# Patient Record
Sex: MALE | Race: WHITE | NOT HISPANIC OR LATINO | Employment: FULL TIME | URBAN - METROPOLITAN AREA
[De-identification: names, ages, dates, MRNs, and addresses within clinical notes are randomized per-mention and may not be internally consistent; named-entity substitution may affect disease eponyms.]

---

## 2017-02-27 ENCOUNTER — TELEPHONE (OUTPATIENT)
Dept: FAMILY MEDICINE CLINIC | Facility: CLINIC | Age: 52
End: 2017-02-27

## 2017-03-06 ENCOUNTER — OFFICE VISIT (OUTPATIENT)
Dept: FAMILY MEDICINE CLINIC | Facility: CLINIC | Age: 52
End: 2017-03-06

## 2017-03-06 VITALS
BODY MASS INDEX: 28.32 KG/M2 | OXYGEN SATURATION: 95 % | HEIGHT: 74 IN | HEART RATE: 76 BPM | WEIGHT: 220.7 LBS | TEMPERATURE: 97 F | SYSTOLIC BLOOD PRESSURE: 141 MMHG | DIASTOLIC BLOOD PRESSURE: 94 MMHG

## 2017-03-06 DIAGNOSIS — M54.5 CHRONIC LOW BACK PAIN, UNSPECIFIED BACK PAIN LATERALITY, WITH SCIATICA PRESENCE UNSPECIFIED: Primary | ICD-10-CM

## 2017-03-06 DIAGNOSIS — G89.29 CHRONIC LOW BACK PAIN, UNSPECIFIED BACK PAIN LATERALITY, WITH SCIATICA PRESENCE UNSPECIFIED: Primary | ICD-10-CM

## 2017-03-06 PROCEDURE — 99213 OFFICE O/P EST LOW 20 MIN: CPT | Performed by: FAMILY MEDICINE

## 2017-03-06 RX ORDER — TRAMADOL HYDROCHLORIDE 50 MG/1
50 TABLET ORAL DAILY PRN
Qty: 90 TABLET | Refills: 1 | Status: SHIPPED | OUTPATIENT
Start: 2017-03-06 | End: 2017-08-28 | Stop reason: SDUPTHER

## 2017-03-06 NOTE — PROGRESS NOTES
"Subjective   Delta Cormier is a 51 y.o. male.     Chief Complaint   Patient presents with   • Med Refill     med refill tramadol        History of Present Illness    Low back pain. Long history. Radicular to left buttocks.  Had an injury about 25 years ago.  Takes occasional Aleve.  Occasional Tylenol also.  Mostly takes tramadol once a day.  He was skiing a few weeks ago took 2 a day.  He ran out about a week ago.  He's had some moodiness since coming off of the tramadol which he is taking daily as above.  However he does not feel like he is dependent.  On average taking one a day.  No longer taking Celebrex, stopped couple years ago.      The following portions of the patient's history were reviewed and updated as appropriate: allergies, current medications, past family history, past medical history, past social history, past surgical history and problem list.          Review of Systems   Constitutional: Negative.    Musculoskeletal: Positive for back pain. Negative for gait problem.       Objective   Blood pressure 141/94, pulse 76, temperature 97 °F (36.1 °C), height 74\" (188 cm), weight 220 lb 11.2 oz (100 kg), SpO2 95 %.  Physical Exam   Constitutional: No distress.   Musculoskeletal:   Low back.  No midline pain.  Good range of motion.  Negative straight leg lift bilaterally.  Hamstrings are tight.  Gait is unremarkable.   Skin: Skin is warm and dry. He is not diaphoretic.   Psychiatric: He has a normal mood and affect. His behavior is normal.       Assessment/Plan   Delta was seen today for med refill.    Diagnoses and all orders for this visit:    Chronic low back pain, unspecified back pain laterality, with sciatica presence unspecified  -     traMADol (ULTRAM) 50 MG tablet; Take 1 tablet by mouth Daily As Needed for moderate pain (4-6) or severe pain (7-10).      Elevated blood pressure reading today.  Repeat is 130/90.  Recommend he continue to monitor blood pressure.  Check a few times a week.  Send us " readings in the next couple weeks.    Chronic back pain.  Taking tramadol daily.  Continue same.The patient has read and signed the [facility] Controlled Substance Contract.  I will continue to see patient for regular follow up appointments.  He is aware that periodic random urine drug screens may be ordered at anytime.  GONZÁLEZ has been reviewed by me and is updated at least every 3 months. The patient is aware of the potential for dependence, tolerance, addiction, and other potentially severe side effects.   May also take Tylenol up to 2 g a day.    Follow-up in 6 months for complete physical examination.  I gave him a written prescription for a CMP PSA CBC and lipid panel.  Because of his insurance he has to go to an outside lab for blood work.

## 2017-08-28 ENCOUNTER — OFFICE VISIT (OUTPATIENT)
Dept: FAMILY MEDICINE CLINIC | Facility: CLINIC | Age: 52
End: 2017-08-28

## 2017-08-28 VITALS
DIASTOLIC BLOOD PRESSURE: 66 MMHG | WEIGHT: 216.2 LBS | BODY MASS INDEX: 27.75 KG/M2 | TEMPERATURE: 97.6 F | HEART RATE: 88 BPM | OXYGEN SATURATION: 98 % | HEIGHT: 74 IN | SYSTOLIC BLOOD PRESSURE: 102 MMHG

## 2017-08-28 DIAGNOSIS — G89.29 CHRONIC LOW BACK PAIN, UNSPECIFIED BACK PAIN LATERALITY, WITH SCIATICA PRESENCE UNSPECIFIED: ICD-10-CM

## 2017-08-28 DIAGNOSIS — Z00.00 HEALTH CARE MAINTENANCE: Primary | ICD-10-CM

## 2017-08-28 DIAGNOSIS — M54.5 CHRONIC LOW BACK PAIN, UNSPECIFIED BACK PAIN LATERALITY, WITH SCIATICA PRESENCE UNSPECIFIED: ICD-10-CM

## 2017-08-28 PROCEDURE — 99396 PREV VISIT EST AGE 40-64: CPT | Performed by: FAMILY MEDICINE

## 2017-08-28 RX ORDER — TRAMADOL HYDROCHLORIDE 50 MG/1
50 TABLET ORAL DAILY PRN
Qty: 90 TABLET | Refills: 1 | Status: SHIPPED | OUTPATIENT
Start: 2017-08-28 | End: 2018-02-19 | Stop reason: SDUPTHER

## 2017-08-28 NOTE — PROGRESS NOTES
Subjective   Delta Cormier is a 52 y.o. male.     Chief Complaint   Patient presents with   • Annual Exam     follow up with labs         History of Present Illness    Social History   Substance Use Topics   • Smoking status: Never Smoker   • Smokeless tobacco: Never Used   • Alcohol use Yes      Comment: 3 a week     Immunization History   Administered Date(s) Administered   • Influenza, Quadrivalent 11/01/2016     Family History   Problem Relation Age of Onset   • Hypertension Mother    • Hypertension Father      Recent lab work was done at [facility].  Total cholesterol 179.  HDL 53.  .  Glucose 104.  Normal creatinine.  CBC within normal limits with exception of a borderline low white blood cell count of 3700.  PSA was 0.3.    Chronic low back pain without sciatica.  Typically has in the left lower back area.  History of a herniated disc some years ago, at least 20 years ago.  He takes tramadol 1 pill a day.  No more no less.  Take naproxen 2 tablets up to twice a day as needed for breakthrough pain.  He's going to plot these exercises often.  He feels very good after the exercise but is back will hurt him in the morning.  He's had no significant urinary changes.  No hematuria.  No trouble with erectile dysfunction or urinary incontinence.  No weakness or numbness in lower extremities.      The following portions of the patient's history were reviewed and updated as appropriate: allergies, current medications, past family history, past medical history, past social history, past surgical history and problem list.          Review of Systems   Constitutional: Negative.  Negative for activity change.   HENT: Negative.    Respiratory: Negative for cough, chest tightness, shortness of breath and wheezing.    Cardiovascular: Negative for chest pain, palpitations and leg swelling.   Gastrointestinal: Negative for abdominal pain, blood in stool, constipation, diarrhea, nausea and vomiting.   Endocrine:  "Negative.    Genitourinary: Negative for difficulty urinating, dysuria and hematuria.   Musculoskeletal: Negative.    Skin: Negative.    Neurological: Negative for headaches.   Psychiatric/Behavioral: Negative.  Negative for behavioral problems.   All other systems reviewed and are negative.      Objective   Blood pressure 102/66, pulse 88, temperature 97.6 °F (36.4 °C), temperature source Oral, height 74\" (188 cm), weight 216 lb 3.2 oz (98.1 kg), SpO2 98 %.  Physical Exam   Constitutional: He is oriented to person, place, and time. He appears well-developed and well-nourished. No distress.   HENT:   Head: Normocephalic and atraumatic.   Right Ear: External ear normal.   Left Ear: External ear normal.   Nose: Nose normal.   Mouth/Throat: Oropharynx is clear and moist. No oropharyngeal exudate.   Eyes: Conjunctivae and EOM are normal. Pupils are equal, round, and reactive to light.   Neck: Normal range of motion. Neck supple. No tracheal deviation present. No thyromegaly present.   Cardiovascular: Normal rate, regular rhythm, normal heart sounds and intact distal pulses.    No murmur heard.  Pulmonary/Chest: Effort normal and breath sounds normal.   Abdominal: Soft. Bowel sounds are normal. He exhibits no abdominal bruit and no mass. There is no hepatosplenomegaly. There is no tenderness. No hernia. Hernia confirmed negative in the right inguinal area and confirmed negative in the left inguinal area.   Genitourinary: Prostate normal, testes normal and penis normal. Prostate is not enlarged ( No nodules.  No masses.) and not tender (smooth, symmetric). Right testis shows no mass and no tenderness. Left testis shows no mass and no tenderness.   Musculoskeletal: Normal range of motion. He exhibits no edema.   Lymphadenopathy:     He has no cervical adenopathy.   Neurological: He is alert and oriented to person, place, and time. He exhibits normal muscle tone.   Skin: Skin is warm. No rash noted.   Psychiatric: He has a " normal mood and affect. His behavior is normal. Judgment and thought content normal.   Nursing note and vitals reviewed.      Assessment/Plan   Delta was seen today for annual exam.    Diagnoses and all orders for this visit:    Health care maintenance    Chronic low back pain, unspecified back pain laterality, with sciatica presence unspecified  -     traMADol (ULTRAM) 50 MG tablet; Take 1 tablet by mouth Daily As Needed for Moderate Pain  or Severe Pain .      Annual clear physical examination.    Immunizations.  He Reportedly up-to-date.  He is fairly sure he had it for his credentials at work a few years ago.  He's going to check records.    Colonoscopy up-to-date.  Recheck 10 years interval.    Chronic low back pain.  Chronic tramadol use.  One a day.  Prescribers agreement is up-to-date.  Patient denies drug use.  He continues exercise almost daily.  He's had a slight flareup of his back pain recently but he states overall it's within his normal limits.  Patient will call with exacerbation of this pain.  To consider physical therapy.  At this time is no indication for MRI.

## 2018-02-19 ENCOUNTER — OFFICE VISIT (OUTPATIENT)
Dept: FAMILY MEDICINE CLINIC | Facility: CLINIC | Age: 53
End: 2018-02-19

## 2018-02-19 VITALS
TEMPERATURE: 97.9 F | HEART RATE: 61 BPM | BODY MASS INDEX: 27.01 KG/M2 | WEIGHT: 210.5 LBS | SYSTOLIC BLOOD PRESSURE: 116 MMHG | OXYGEN SATURATION: 98 % | HEIGHT: 74 IN | DIASTOLIC BLOOD PRESSURE: 80 MMHG

## 2018-02-19 DIAGNOSIS — G89.29 CHRONIC LOW BACK PAIN, UNSPECIFIED BACK PAIN LATERALITY, WITH SCIATICA PRESENCE UNSPECIFIED: ICD-10-CM

## 2018-02-19 DIAGNOSIS — Z00.00 HEALTH CARE MAINTENANCE: Primary | ICD-10-CM

## 2018-02-19 DIAGNOSIS — M54.5 CHRONIC LOW BACK PAIN, UNSPECIFIED BACK PAIN LATERALITY, WITH SCIATICA PRESENCE UNSPECIFIED: ICD-10-CM

## 2018-02-19 DIAGNOSIS — Z12.5 SCREENING PSA (PROSTATE SPECIFIC ANTIGEN): ICD-10-CM

## 2018-02-19 PROCEDURE — 99213 OFFICE O/P EST LOW 20 MIN: CPT | Performed by: FAMILY MEDICINE

## 2018-02-19 RX ORDER — TRAMADOL HYDROCHLORIDE 50 MG/1
50 TABLET ORAL DAILY PRN
Qty: 90 TABLET | Refills: 1 | Status: SHIPPED | OUTPATIENT
Start: 2018-02-19 | End: 2018-08-21 | Stop reason: SDUPTHER

## 2018-02-19 NOTE — PROGRESS NOTES
"Subjective   Delta VARUN Cormier is a 52 y.o. male.     Chief Complaint   Patient presents with   • Back Pain     medication refill        History of Present Illness    Chronic back pain.  Ongoing.  Overall little better.  He takes tramadol about once a day.  Sometimes not at all.  He also takes Aleve 2 tablets twice a day.  In injury some years ago at home.  No red flags.  He brought in his pills.  There are still some left from the previous prescription.  He is exercising more.  Doing Pilates.  He's been losing some weight through exercise.  Overall the exercise he states has helped his back pain.      The following portions of the patient's history were reviewed and updated as appropriate: allergies, current medications, past family history, past medical history, past social history, past surgical history and problem list.          Review of Systems   Constitutional: Negative.    Musculoskeletal: Positive for back pain.   Neurological: Negative for weakness and numbness.   Psychiatric/Behavioral: Negative.        Objective   Blood pressure 116/80, pulse 61, temperature 97.9 °F (36.6 °C), temperature source Oral, height 188 cm (74.02\"), weight 95.5 kg (210 lb 8 oz), SpO2 98 %.  Physical Exam   Constitutional: He is oriented to person, place, and time. No distress.   Cardiovascular: Normal rate and regular rhythm.    Musculoskeletal: Normal range of motion. He exhibits no tenderness.   Neurological: He is alert and oriented to person, place, and time.   Skin: Skin is warm and dry.   Psychiatric: He has a normal mood and affect. His behavior is normal.       Assessment/Plan   Delta was seen today for back pain.    Diagnoses and all orders for this visit:    Health care maintenance  -     CBC & Differential; Future  -     Comprehensive Metabolic Panel; Future  -     Lipid Panel; Future    Chronic low back pain, unspecified back pain laterality, with sciatica presence unspecified  -     traMADol (ULTRAM) 50 MG tablet; Take " 1 tablet by mouth Daily As Needed for Moderate Pain  or Severe Pain .    Screening PSA (prostate specific antigen)  -     PSA Screen; Future      Musculoskeletal chronic low back pain.  Intermittent sciatica of the left thigh at times.  Overall slightly improved.  He continues on tramadol approximately daily.  No red flags.  Dennis reviewed and consistent with history.  Prescribers agreement up-to-date.  I'll see him back in 6 months for complete physical examination.  Need CMP, CBC, lipid panel, PSA prior

## 2018-02-24 ENCOUNTER — RESULTS ENCOUNTER (OUTPATIENT)
Dept: FAMILY MEDICINE CLINIC | Facility: CLINIC | Age: 53
End: 2018-02-24

## 2018-02-24 DIAGNOSIS — Z12.5 SCREENING PSA (PROSTATE SPECIFIC ANTIGEN): ICD-10-CM

## 2018-02-24 DIAGNOSIS — Z00.00 HEALTH CARE MAINTENANCE: ICD-10-CM

## 2018-08-21 ENCOUNTER — OFFICE VISIT (OUTPATIENT)
Dept: FAMILY MEDICINE CLINIC | Facility: CLINIC | Age: 53
End: 2018-08-21

## 2018-08-21 VITALS
HEART RATE: 73 BPM | BODY MASS INDEX: 26.94 KG/M2 | DIASTOLIC BLOOD PRESSURE: 83 MMHG | OXYGEN SATURATION: 97 % | WEIGHT: 209.9 LBS | HEIGHT: 74 IN | SYSTOLIC BLOOD PRESSURE: 111 MMHG | TEMPERATURE: 97.1 F

## 2018-08-21 DIAGNOSIS — M54.5 CHRONIC LOW BACK PAIN, UNSPECIFIED BACK PAIN LATERALITY, WITH SCIATICA PRESENCE UNSPECIFIED: Primary | ICD-10-CM

## 2018-08-21 DIAGNOSIS — G89.29 CHRONIC LOW BACK PAIN, UNSPECIFIED BACK PAIN LATERALITY, WITH SCIATICA PRESENCE UNSPECIFIED: Primary | ICD-10-CM

## 2018-08-21 PROCEDURE — 99213 OFFICE O/P EST LOW 20 MIN: CPT | Performed by: FAMILY MEDICINE

## 2018-08-21 RX ORDER — TRAMADOL HYDROCHLORIDE 50 MG/1
50 TABLET ORAL DAILY PRN
Qty: 90 TABLET | Refills: 1 | Status: SHIPPED | OUTPATIENT
Start: 2018-08-21 | End: 2019-02-19 | Stop reason: SDUPTHER

## 2018-08-21 NOTE — PROGRESS NOTES
"Subjective   Delta Cormier is a 53 y.o. male.     Chief Complaint   Patient presents with   • Back Pain     pt is not fasting        History of Present Illness    Chronic back pain.  Had a nonwork related injury 20 years ago with a herniated disc.  Has intermittent back pain with left leg sciatica.  Intermittent.  Usually after heavy lifting.  He takes tramadol 50 mg once a day.  He'll then supplement with naproxen over-the-counter 2 or 3 tablets as needed.  No history of hypertension.  Lab work last year including kidney function was normal.      The following portions of the patient's history were reviewed and updated as appropriate: allergies, current medications, past family history, past medical history, past social history, past surgical history and problem list.          Review of Systems   Constitutional: Negative.    Musculoskeletal: Positive for back pain.   Neurological: Negative for weakness and numbness.       Objective   Blood pressure 111/83, pulse 73, temperature 97.1 °F (36.2 °C), temperature source Oral, height 188 cm (74.02\"), weight 95.2 kg (209 lb 14.4 oz), SpO2 97 %.  Physical Exam   Constitutional: He appears well-developed and well-nourished. No distress.   Neck: No thyromegaly present.   Cardiovascular: Normal rate, regular rhythm, normal heart sounds and intact distal pulses.    Pulmonary/Chest: Effort normal and breath sounds normal.   Musculoskeletal: Normal range of motion. He exhibits no edema, tenderness or deformity.   Neurological: He exhibits normal muscle tone. Coordination normal.   Skin: Skin is warm and dry.   Psychiatric: He has a normal mood and affect. His behavior is normal. Judgment and thought content normal.   Nursing note and vitals reviewed.      Assessment/Plan   Delta was seen today for back pain.    Diagnoses and all orders for this visit:    Chronic low back pain, unspecified back pain laterality, with sciatica presence unspecified  -     traMADol (ULTRAM) 50 MG " tablet; Take 1 tablet by mouth Daily As Needed for Moderate Pain  or Severe Pain .        Follow-up chronic back pain.  Patient is doing well with one tramadol daily.  He is taking up to 3 naproxen at a time, 220 mg.  I recommend maximum of or the counter tablets at one time.  He doesn't take it very often.  We'll see him back within 6 months for annual wellness visit.  He has a lab order on the chart, he goes outside lab.  They're going to print off the orders for him.

## 2019-01-21 DIAGNOSIS — Z12.5 SCREENING FOR PROSTATE CANCER: ICD-10-CM

## 2019-01-21 DIAGNOSIS — Z00.00 HEALTH CARE MAINTENANCE: Primary | ICD-10-CM

## 2019-02-19 ENCOUNTER — OFFICE VISIT (OUTPATIENT)
Dept: FAMILY MEDICINE CLINIC | Facility: CLINIC | Age: 54
End: 2019-02-19

## 2019-02-19 VITALS
WEIGHT: 211.6 LBS | HEIGHT: 74 IN | DIASTOLIC BLOOD PRESSURE: 83 MMHG | SYSTOLIC BLOOD PRESSURE: 134 MMHG | TEMPERATURE: 97.5 F | HEART RATE: 90 BPM | OXYGEN SATURATION: 98 % | BODY MASS INDEX: 27.16 KG/M2

## 2019-02-19 DIAGNOSIS — G89.29 CHRONIC LOW BACK PAIN, UNSPECIFIED BACK PAIN LATERALITY, WITH SCIATICA PRESENCE UNSPECIFIED: ICD-10-CM

## 2019-02-19 DIAGNOSIS — Z00.00 HEALTH CARE MAINTENANCE: Primary | ICD-10-CM

## 2019-02-19 DIAGNOSIS — M54.5 CHRONIC LOW BACK PAIN, UNSPECIFIED BACK PAIN LATERALITY, WITH SCIATICA PRESENCE UNSPECIFIED: ICD-10-CM

## 2019-02-19 PROCEDURE — 99396 PREV VISIT EST AGE 40-64: CPT | Performed by: FAMILY MEDICINE

## 2019-02-19 RX ORDER — SILDENAFIL 100 MG/1
100 TABLET, FILM COATED ORAL DAILY PRN
Qty: 6 TABLET | Refills: 6 | Status: SHIPPED | OUTPATIENT
Start: 2019-02-19 | End: 2020-02-10 | Stop reason: SDUPTHER

## 2019-02-19 RX ORDER — TRAMADOL HYDROCHLORIDE 50 MG/1
50 TABLET ORAL DAILY PRN
Qty: 90 TABLET | Refills: 1 | Status: SHIPPED | OUTPATIENT
Start: 2019-02-19 | End: 2019-08-20 | Stop reason: SDUPTHER

## 2019-02-19 NOTE — PROGRESS NOTES
Subjective   Delta Cormier is a 53 y.o. male.     Chief Complaint   Patient presents with   • Annual Exam     follow up labs   • Hyperglycemia   • Back Pain        History of Present Illness     Annual wellness visit.    Chronic back pain.  Injury 20 years ago, nonwork related.  He takes Ultram 50 mg once a day.  For a number of years.  No side effects.  No constipation.  He is not sharing the medication with anybody.  He also does extensive exercises to maintain back health.  He is working out about 4 days a week at least.    Reviewed lab work.  The cholesterol panel is much improved.  HDL remains high, and now higher.  LDL lower.  His glucose fasting was 110.  However he thinks he had that morning some antacids with carbohydrates in it.  About 5 g.    Erectile dysfunction.  He was on vacation in [place].  But Viagra 100 mg.  He took 3 pills throughout the week.  He states it helped.  He did not have any side effects.  He states at times erectile dysfunction is an issue.      Social History     Tobacco Use   • Smoking status: Never Smoker   • Smokeless tobacco: Never Used   Substance Use Topics   • Alcohol use: Yes     Comment: 3 a week   • Drug use: No     Immunization History   Administered Date(s) Administered   • Flu Mist 11/01/2016   • Flu Vaccine Intradermal Quad 18-64YR 10/15/2018   • Hepatitis A 07/04/2018     Family History   Problem Relation Age of Onset   • Hypertension Mother    • Hypertension Father          The following portions of the patient's history were reviewed and updated as appropriate: allergies, current medications, past family history, past medical history, past social history, past surgical history and problem list.          Review of Systems   Constitutional: Negative.    HENT: Negative.    Respiratory: Negative.    Cardiovascular: Negative.    Gastrointestinal: Negative.    Endocrine: Negative.    Genitourinary: Negative.    Musculoskeletal: Positive for back pain.   Skin: Negative.   "  Neurological: Negative.  Negative for headaches.   Psychiatric/Behavioral: Negative.    All other systems reviewed and are negative.      Objective   Blood pressure 134/83, pulse 90, temperature 97.5 °F (36.4 °C), temperature source Oral, height 188 cm (74.02\"), weight 96 kg (211 lb 9.6 oz), SpO2 98 %.  Physical Exam   Constitutional: He is oriented to person, place, and time. No distress.   HENT:   Head: Normocephalic and atraumatic.   Right Ear: External ear normal.   Left Ear: External ear normal.   Mouth/Throat: Oropharynx is clear and moist.   Eyes: EOM are normal. Pupils are equal, round, and reactive to light.   Neck: Normal range of motion. Neck supple.   Cardiovascular: Normal rate and regular rhythm.   Pulmonary/Chest: Effort normal and breath sounds normal.   Abdominal: Soft. Bowel sounds are normal. He exhibits no distension and no mass. There is no tenderness. There is no guarding. No hernia.   Genitourinary: Prostate normal. Prostate is not enlarged and not tender.   Musculoskeletal: Normal range of motion. He exhibits no edema or tenderness.   Neurological: He is alert and oriented to person, place, and time. He exhibits normal muscle tone. Coordination normal.   Skin: Skin is warm and dry.   Psychiatric: He has a normal mood and affect. His behavior is normal.   Nursing note and vitals reviewed.      Assessment/Plan   Delta was seen today for annual exam, hyperglycemia and back pain.    Diagnoses and all orders for this visit:    Health care maintenance    Chronic low back pain, unspecified back pain laterality, with sciatica presence unspecified  -     traMADol (ULTRAM) 50 MG tablet; Take 1 tablet by mouth Daily As Needed for Moderate Pain  or Severe Pain .    Other orders  -     sildenafil (VIAGRA) 100 MG tablet; Take 1 tablet by mouth Daily As Needed for erectile dysfunction.    Annual wellness visit.    Immunizations.  I recommend Shingrix at a retail pharmacy.  Also you will need to get his " "second hepatitis A.    Chronic low back pain.  He continues tramadol 80 mg a day.  No red flag behavior.  No side effects.    Erectile dysfunction.  Risks and benefits of Viagra discussed in detail.  Patient understands.  Risks include lightheadedness, dizziness, and transient \"blue vision\".  More serious but very rare risks include priapism, which is a blood clot in the penis which could lead to complete loss of future erectile function.  If the patient has an erection that lasts more than 2 hours, he is to go to the emergency room.  Another equally rare risk is that of a blood clot in the eye or eyes which could cause permanent blindness.  I am prescribing Viagra 100 mg.  Take 1/2 tablet.    Prostate cancer screening up-to-date.    Colon cancer screening up-to-date.             "

## 2019-08-20 ENCOUNTER — OFFICE VISIT (OUTPATIENT)
Dept: FAMILY MEDICINE CLINIC | Facility: CLINIC | Age: 54
End: 2019-08-20

## 2019-08-20 VITALS
HEART RATE: 77 BPM | OXYGEN SATURATION: 96 % | HEIGHT: 74 IN | DIASTOLIC BLOOD PRESSURE: 88 MMHG | SYSTOLIC BLOOD PRESSURE: 124 MMHG | TEMPERATURE: 97.4 F | BODY MASS INDEX: 27.17 KG/M2 | WEIGHT: 211.7 LBS

## 2019-08-20 DIAGNOSIS — G89.29 CHRONIC LOW BACK PAIN, UNSPECIFIED BACK PAIN LATERALITY, WITH SCIATICA PRESENCE UNSPECIFIED: ICD-10-CM

## 2019-08-20 DIAGNOSIS — M54.5 CHRONIC LOW BACK PAIN, UNSPECIFIED BACK PAIN LATERALITY, WITH SCIATICA PRESENCE UNSPECIFIED: ICD-10-CM

## 2019-08-20 DIAGNOSIS — Z00.00 HEALTH CARE MAINTENANCE: Primary | ICD-10-CM

## 2019-08-20 DIAGNOSIS — Z12.5 SCREENING PSA (PROSTATE SPECIFIC ANTIGEN): ICD-10-CM

## 2019-08-20 PROCEDURE — 99213 OFFICE O/P EST LOW 20 MIN: CPT | Performed by: FAMILY MEDICINE

## 2019-08-20 RX ORDER — TRAMADOL HYDROCHLORIDE 50 MG/1
50 TABLET ORAL DAILY PRN
Qty: 90 TABLET | Refills: 1 | Status: SHIPPED | OUTPATIENT
Start: 2019-08-20 | End: 2020-02-10 | Stop reason: SDUPTHER

## 2019-08-20 NOTE — PROGRESS NOTES
"Subjective   Delta VARUN Cormier is a 54 y.o. male.     Chief Complaint   Patient presents with   • Back Pain        History of Present Illness    Chronic back pain.  Follow-up.  Injury 20 years ago, non-work-related.  Had a herniated disc.  He continues tramadol 50 mg once a day.  Very consistently.  No side effects.  No sedation.  No constipation.  No irritability.  He states he gets drug screens done through work.  He states he drinks a small bourbon a few times a week.  No marijuana use.  No other drug use.  Non-smoker.  Medication is working for him.  Keeps the pain under control.  He is doing core conditioning.  Nobody else is taking his medication.  Dennis reviewed.  Prescribers agreement up-to-date today.      The following portions of the patient's history were reviewed and updated as appropriate: allergies, current medications, past family history, past medical history, past social history, past surgical history and problem list.          Review of Systems   Constitutional: Negative.    Respiratory: Negative.    Cardiovascular: Negative.    Musculoskeletal: Positive for back pain.   Neurological: Negative for weakness.       Objective   Blood pressure 124/88, pulse 77, temperature 97.4 °F (36.3 °C), temperature source Oral, height 188 cm (74.02\"), weight 96 kg (211 lb 11.2 oz), SpO2 96 %.  Physical Exam   Constitutional: He appears well-developed and well-nourished. No distress.   Neck: No thyromegaly present.   Cardiovascular: Normal rate, regular rhythm, normal heart sounds and intact distal pulses.   Pulmonary/Chest: Effort normal and breath sounds normal.   Musculoskeletal: Normal range of motion. He exhibits no edema.   Skin: Skin is warm and dry.   Psychiatric: He has a normal mood and affect. His behavior is normal. Judgment and thought content normal.   Nursing note and vitals reviewed.      Assessment/Plan   Delta was seen today for back pain.    Diagnoses and all orders for this visit:    Chronic low " back pain, unspecified back pain laterality, with sciatica presence unspecified  -     traMADol (ULTRAM) 50 MG tablet; Take 1 tablet by mouth Daily As Needed for Moderate Pain  or Severe Pain .    Chronic low back pain with ongoing tramadol use.  Once daily.  No red flags.  I have asked him to send us a copy of his drug screen from work.  I will see him back in 6 months for annual wellness visit and recheck.

## 2019-11-18 ENCOUNTER — RESULTS ENCOUNTER (OUTPATIENT)
Dept: FAMILY MEDICINE CLINIC | Facility: CLINIC | Age: 54
End: 2019-11-18

## 2019-11-18 DIAGNOSIS — Z00.00 HEALTH CARE MAINTENANCE: ICD-10-CM

## 2019-11-18 DIAGNOSIS — Z12.5 SCREENING PSA (PROSTATE SPECIFIC ANTIGEN): ICD-10-CM

## 2020-02-10 ENCOUNTER — OFFICE VISIT (OUTPATIENT)
Dept: FAMILY MEDICINE CLINIC | Facility: CLINIC | Age: 55
End: 2020-02-10

## 2020-02-10 VITALS
SYSTOLIC BLOOD PRESSURE: 115 MMHG | DIASTOLIC BLOOD PRESSURE: 82 MMHG | HEIGHT: 74 IN | OXYGEN SATURATION: 96 % | BODY MASS INDEX: 26.82 KG/M2 | WEIGHT: 209 LBS | TEMPERATURE: 96.8 F | HEART RATE: 99 BPM

## 2020-02-10 DIAGNOSIS — M54.50 CHRONIC LOW BACK PAIN, UNSPECIFIED BACK PAIN LATERALITY, UNSPECIFIED WHETHER SCIATICA PRESENT: Primary | ICD-10-CM

## 2020-02-10 DIAGNOSIS — G89.29 CHRONIC LOW BACK PAIN, UNSPECIFIED BACK PAIN LATERALITY, UNSPECIFIED WHETHER SCIATICA PRESENT: Primary | ICD-10-CM

## 2020-02-10 DIAGNOSIS — H71.91 CHOLESTEATOMA OF RIGHT EAR: ICD-10-CM

## 2020-02-10 PROBLEM — N52.9 MALE ERECTILE DYSFUNCTION: Status: ACTIVE | Noted: 2020-02-10

## 2020-02-10 PROCEDURE — 99213 OFFICE O/P EST LOW 20 MIN: CPT | Performed by: FAMILY MEDICINE

## 2020-02-10 RX ORDER — SILDENAFIL 100 MG/1
100 TABLET, FILM COATED ORAL DAILY PRN
Qty: 6 TABLET | Refills: 6 | Status: SHIPPED | OUTPATIENT
Start: 2020-02-10 | End: 2020-08-10 | Stop reason: SDUPTHER

## 2020-02-10 RX ORDER — TRAMADOL HYDROCHLORIDE 50 MG/1
50 TABLET ORAL DAILY PRN
Qty: 90 TABLET | Refills: 1 | Status: SHIPPED | OUTPATIENT
Start: 2020-02-10 | End: 2020-08-10 | Stop reason: SDUPTHER

## 2020-02-10 NOTE — PROGRESS NOTES
"Subjective   Delta VARUN Cormier is a 54 y.o. male.     Chief Complaint   Patient presents with   • Back Pain   • Tinnitus     in right ear         History of Present Illness    Follow-up chronic back pain.  He continues on tramadol daily for a number of years.  It controls his back pain.  Remote history of back injury.  Doing well with just 1 pill.  No red flags.  Also takes occasional Viagra for erectile dysfunction.    Throbbing in his right ear at nighttime.  No tinnitus.  No vertigo.  No pain.  He just notices a pulsation in his right ear at nighttime only.  For the last few weeks.  It is somewhat bothersome to him but not a problem.      The following portions of the patient's history were reviewed and updated as appropriate: allergies, current medications, past family history, past medical history, past social history, past surgical history and problem list.          Review of Systems   Constitutional: Negative.    HENT: Negative for ear pain, hearing loss and tinnitus.    Respiratory: Negative.    Cardiovascular: Negative.    Musculoskeletal: Positive for back pain.   Neurological: Negative for dizziness.       Objective   Blood pressure 115/82, pulse 99, temperature 96.8 °F (36 °C), temperature source Oral, height 188 cm (74.02\"), weight 94.8 kg (209 lb), SpO2 96 %.  Physical Exam   HENT:   Head: Atraumatic.   The left tympanic membrane and left ear canals unremarkable.  The right tympanic membrane is unremarkable.  The right canal reveals 2 or 3 prominent pearly nodules.  Cholesteatoma versus sebaceous cyst.  They appear very close to the tympanic membrane.       Assessment/Plan   Delta was seen today for back pain and tinnitus.    Diagnoses and all orders for this visit:    Chronic low back pain, unspecified back pain laterality, unspecified whether sciatica present  -     traMADol (ULTRAM) 50 MG tablet; Take 1 tablet by mouth Daily As Needed for Moderate Pain  or Severe Pain .    Cholesteatoma of right ear  - "     Ambulatory Referral to ENT (Otolaryngology)    Other orders  -     sildenafil (VIAGRA) 100 MG tablet; Take 1 tablet by mouth Daily As Needed for Erectile Dysfunction.      Chronic low back pain.  Doing well with tramadol daily.  Continue same.  See me in 6 months for recheck and annual wellness visit with lab work prior.    Cholesteatoma versus sebaceous cyst right ear.  Referred to ENT for an opinion.  This time not alarming appearing, but does need further investigation.

## 2020-08-05 LAB
ALBUMIN SERPL-MCNC: 4.6 G/DL (ref 3.5–5.2)
ALBUMIN/GLOB SERPL: 3.1 G/DL
ALP SERPL-CCNC: 48 U/L (ref 39–117)
ALT SERPL-CCNC: 17 U/L (ref 1–41)
AST SERPL-CCNC: 19 U/L (ref 1–40)
BASOPHILS # BLD AUTO: 0.06 10*3/MM3 (ref 0–0.2)
BASOPHILS NFR BLD AUTO: 1.5 % (ref 0–1.5)
BILIRUB SERPL-MCNC: 0.4 MG/DL (ref 0–1.2)
BUN SERPL-MCNC: 18 MG/DL (ref 6–20)
BUN/CREAT SERPL: 17 (ref 7–25)
CALCIUM SERPL-MCNC: 9 MG/DL (ref 8.6–10.5)
CHLORIDE SERPL-SCNC: 103 MMOL/L (ref 98–107)
CHOLEST SERPL-MCNC: 164 MG/DL (ref 0–200)
CO2 SERPL-SCNC: 26.9 MMOL/L (ref 22–29)
CREAT SERPL-MCNC: 1.06 MG/DL (ref 0.76–1.27)
EOSINOPHIL # BLD AUTO: 0.43 10*3/MM3 (ref 0–0.4)
EOSINOPHIL NFR BLD AUTO: 10.5 % (ref 0.3–6.2)
ERYTHROCYTE [DISTWIDTH] IN BLOOD BY AUTOMATED COUNT: 12.9 % (ref 12.3–15.4)
GLOBULIN SER CALC-MCNC: 1.5 GM/DL
GLUCOSE SERPL-MCNC: 105 MG/DL (ref 65–99)
HCT VFR BLD AUTO: 41.3 % (ref 37.5–51)
HDLC SERPL-MCNC: 55 MG/DL (ref 40–60)
HGB BLD-MCNC: 14.7 G/DL (ref 13–17.7)
IMM GRANULOCYTES # BLD AUTO: 0.01 10*3/MM3 (ref 0–0.05)
IMM GRANULOCYTES NFR BLD AUTO: 0.2 % (ref 0–0.5)
LDLC SERPL CALC-MCNC: 99 MG/DL (ref 0–100)
LYMPHOCYTES # BLD AUTO: 1.64 10*3/MM3 (ref 0.7–3.1)
LYMPHOCYTES NFR BLD AUTO: 40.2 % (ref 19.6–45.3)
MCH RBC QN AUTO: 33.4 PG (ref 26.6–33)
MCHC RBC AUTO-ENTMCNC: 35.6 G/DL (ref 31.5–35.7)
MCV RBC AUTO: 93.9 FL (ref 79–97)
MONOCYTES # BLD AUTO: 0.35 10*3/MM3 (ref 0.1–0.9)
MONOCYTES NFR BLD AUTO: 8.6 % (ref 5–12)
NEUTROPHILS # BLD AUTO: 1.59 10*3/MM3 (ref 1.7–7)
NEUTROPHILS NFR BLD AUTO: 39 % (ref 42.7–76)
NRBC BLD AUTO-RTO: 0 /100 WBC (ref 0–0.2)
PLATELET # BLD AUTO: 191 10*3/MM3 (ref 140–450)
POTASSIUM SERPL-SCNC: 4.8 MMOL/L (ref 3.5–5.2)
PROT SERPL-MCNC: 6.1 G/DL (ref 6–8.5)
PSA SERPL-MCNC: 0.44 NG/ML (ref 0–4)
RBC # BLD AUTO: 4.4 10*6/MM3 (ref 4.14–5.8)
SODIUM SERPL-SCNC: 140 MMOL/L (ref 136–145)
TRIGL SERPL-MCNC: 50 MG/DL (ref 0–150)
VLDLC SERPL CALC-MCNC: 10 MG/DL
WBC # BLD AUTO: 4.08 10*3/MM3 (ref 3.4–10.8)

## 2020-08-10 ENCOUNTER — OFFICE VISIT (OUTPATIENT)
Dept: FAMILY MEDICINE CLINIC | Facility: CLINIC | Age: 55
End: 2020-08-10

## 2020-08-10 VITALS
WEIGHT: 212 LBS | HEART RATE: 66 BPM | SYSTOLIC BLOOD PRESSURE: 128 MMHG | HEIGHT: 74 IN | BODY MASS INDEX: 27.21 KG/M2 | DIASTOLIC BLOOD PRESSURE: 86 MMHG | OXYGEN SATURATION: 98 % | TEMPERATURE: 97.3 F

## 2020-08-10 DIAGNOSIS — M54.50 CHRONIC LOW BACK PAIN, UNSPECIFIED BACK PAIN LATERALITY, UNSPECIFIED WHETHER SCIATICA PRESENT: ICD-10-CM

## 2020-08-10 DIAGNOSIS — G89.29 CHRONIC LOW BACK PAIN, UNSPECIFIED BACK PAIN LATERALITY, UNSPECIFIED WHETHER SCIATICA PRESENT: ICD-10-CM

## 2020-08-10 DIAGNOSIS — Z00.00 HEALTH CARE MAINTENANCE: Primary | ICD-10-CM

## 2020-08-10 DIAGNOSIS — N52.9 ERECTILE DYSFUNCTION, UNSPECIFIED ERECTILE DYSFUNCTION TYPE: ICD-10-CM

## 2020-08-10 PROBLEM — C44.222: Status: ACTIVE | Noted: 2020-08-10

## 2020-08-10 PROCEDURE — 99396 PREV VISIT EST AGE 40-64: CPT | Performed by: FAMILY MEDICINE

## 2020-08-10 RX ORDER — SILDENAFIL 100 MG/1
100 TABLET, FILM COATED ORAL DAILY PRN
Qty: 6 TABLET | Refills: 6 | Status: SHIPPED | OUTPATIENT
Start: 2020-08-10 | End: 2021-08-10

## 2020-08-10 RX ORDER — TRAMADOL HYDROCHLORIDE 50 MG/1
50 TABLET ORAL DAILY PRN
Qty: 90 TABLET | Refills: 1 | Status: SHIPPED | OUTPATIENT
Start: 2020-08-10 | End: 2021-02-08 | Stop reason: SDUPTHER

## 2020-08-10 NOTE — PROGRESS NOTES
Subjective   Delta Cormier is a 55 y.o. male.     Chief Complaint   Patient presents with   • Annual Exam     follow up labs         History of Present Illness    Here for annual wellness visit.  Patient is been very physically active since the coronavirus lockdown.  Exercising more.  He states he is not been doing his core conditioning as much his back started to flareup a little bit.  He gets some chronic back pain low back with a burning in the center low back pain with no radiculopathy weakness or numbness or urinary incontinence.  He continues tramadol once a day without issue.  He takes some Aleve in the evening if he needs to.    He does describe some urinary dribbling at times.  But no saddle anesthesia.  No numbness in his penis or scrotum.  And no rashaad urinary incontinence as above.    No hematuria.    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  He states it was small.  Reportedly the margins were clean.    Social History     Tobacco Use   • Smoking status: Never Smoker   • Smokeless tobacco: Never Used   Substance Use Topics   • Alcohol use: Yes     Comment: 3 a week   • Drug use: No     Immunization History   Administered Date(s) Administered   • FLUARIX/FLUZONE/AFLURIA/FLULAVAL QUAD 09/23/2019   • Flu Mist 11/01/2016   • Flu Vaccine Intradermal Quad 18-64YR 10/15/2018   • Hepatitis A 07/04/2018   • PPD Test 04/23/2015   • Tdap 09/11/2015     Family History   Problem Relation Age of Onset   • Hypertension Mother    • Hypertension Father          The following portions of the patient's history were reviewed and updated as appropriate: allergies, current medications, past family history, past medical history, past social history, past surgical history and problem list.          Review of Systems   Constitutional: Negative.    HENT: Negative.    Respiratory: Negative.    Cardiovascular: Negative.    Gastrointestinal: Negative.  Negative for blood in stool.        No dysphagia.   "No severe acid reflux symptoms.   Endocrine: Negative.    Genitourinary: Negative.  Negative for hematuria.   Musculoskeletal: Negative.    Skin: Negative.    Neurological: Negative.    Psychiatric/Behavioral: Negative.    All other systems reviewed and are negative.      Objective   Blood pressure 128/86, pulse 66, temperature 97.3 °F (36.3 °C), temperature source Tympanic, height 188 cm (74.02\"), weight 96.2 kg (212 lb), SpO2 98 %.  Physical Exam   Constitutional: He is oriented to person, place, and time. No distress.   HENT:   Head: Normocephalic and atraumatic.   Right Ear: External ear normal.   Left Ear: External ear normal.   Mouth/Throat: Oropharynx is clear and moist.   The right auricle reveals a small surgical wound that appears to be healing well..   Eyes: Pupils are equal, round, and reactive to light. EOM are normal.   Neck: Normal range of motion. Neck supple.   There is no palpable head neck adenopathy or mass.  The parotid glands are not palpable.   Cardiovascular: Normal rate and regular rhythm.   Pulmonary/Chest: Effort normal and breath sounds normal.   Abdominal: Soft. Bowel sounds are normal. He exhibits no distension and no mass. There is no tenderness. There is no guarding. No hernia.   Genitourinary: Prostate normal. Prostate is not enlarged and not tender.   Musculoskeletal: Normal range of motion. He exhibits no edema or tenderness.   Neurological: He is alert and oriented to person, place, and time. He exhibits normal muscle tone. Coordination normal.   Skin: Skin is warm and dry.   Psychiatric: He has a normal mood and affect. His behavior is normal.   Nursing note and vitals reviewed.      Assessment/Plan   Delta was seen today for annual exam.    Diagnoses and all orders for this visit:    Health care maintenance    Chronic low back pain, unspecified back pain laterality, unspecified whether sciatica present  -     traMADol (ULTRAM) 50 MG tablet; Take 1 tablet by mouth Daily As Needed " for Moderate Pain  or Severe Pain .    Erectile dysfunction, unspecified erectile dysfunction type    Other orders  -     sildenafil (VIAGRA) 100 MG tablet; Take 1 tablet by mouth Daily As Needed for Erectile Dysfunction.      Annual wellness visit.    Immunizations.  Recommend Shingrix at retail pharmacy.    Chronic low back pain.  Continues tramadol.  There is no functional deficit.  We discussed the addition of daily gabapentin.  Patient wants to hold off on that.  If the pain becomes more problematic going let me know otherwise he is going to continue and actually restart his core exercises.    Erectile dysfunction.  Continues Viagra without complaint.    Prostate cancer screening up-to-date.    Colon cancer screening up-to-date.    Preventive health practices discussed including regular exercise.  I will see him back in 6 months for recheck sooner as needed.  I have asked him to get me a copy of his recent lab work done through his life insurance physical.  Especially if there is a urine drug screen there.  No red flag behavior with regards to tramadol use however.

## 2021-02-08 DIAGNOSIS — M54.50 CHRONIC LOW BACK PAIN, UNSPECIFIED BACK PAIN LATERALITY, UNSPECIFIED WHETHER SCIATICA PRESENT: ICD-10-CM

## 2021-02-08 DIAGNOSIS — G89.29 CHRONIC LOW BACK PAIN, UNSPECIFIED BACK PAIN LATERALITY, UNSPECIFIED WHETHER SCIATICA PRESENT: ICD-10-CM

## 2021-02-08 RX ORDER — TRAMADOL HYDROCHLORIDE 50 MG/1
50 TABLET ORAL DAILY PRN
Qty: 90 TABLET | Refills: 1 | Status: SHIPPED | OUTPATIENT
Start: 2021-02-08 | End: 2021-08-04 | Stop reason: SDUPTHER

## 2021-02-09 RX ORDER — TRAMADOL HYDROCHLORIDE 50 MG/1
50 TABLET ORAL DAILY PRN
Qty: 90 TABLET | Refills: 1 | OUTPATIENT
Start: 2021-02-09

## 2021-02-22 ENCOUNTER — OFFICE VISIT (OUTPATIENT)
Dept: FAMILY MEDICINE CLINIC | Facility: CLINIC | Age: 56
End: 2021-02-22

## 2021-02-22 VITALS
BODY MASS INDEX: 28.11 KG/M2 | SYSTOLIC BLOOD PRESSURE: 135 MMHG | DIASTOLIC BLOOD PRESSURE: 91 MMHG | WEIGHT: 219 LBS | HEIGHT: 74 IN | HEART RATE: 76 BPM | OXYGEN SATURATION: 96 % | TEMPERATURE: 97.4 F

## 2021-02-22 DIAGNOSIS — G89.29 CHRONIC LOW BACK PAIN, UNSPECIFIED BACK PAIN LATERALITY, UNSPECIFIED WHETHER SCIATICA PRESENT: Primary | ICD-10-CM

## 2021-02-22 DIAGNOSIS — M54.50 CHRONIC LOW BACK PAIN, UNSPECIFIED BACK PAIN LATERALITY, UNSPECIFIED WHETHER SCIATICA PRESENT: Primary | ICD-10-CM

## 2021-02-22 PROCEDURE — 99213 OFFICE O/P EST LOW 20 MIN: CPT | Performed by: FAMILY MEDICINE

## 2021-02-22 RX ORDER — ACETAMINOPHEN 500 MG
500 TABLET ORAL
COMMUNITY

## 2021-02-22 RX ORDER — LORATADINE 10 MG/1
10 TABLET ORAL DAILY
COMMUNITY

## 2021-02-22 NOTE — PROGRESS NOTES
Chief Complaint  Back Pain (FOLLOW UP )    Andreia Cormier presents to [facility]  Back Pain  This is a chronic problem. The current episode started more than 1 year ago. The problem occurs constantly. The problem has been gradually worsening since onset. The pain is present in the sacro-iliac. The quality of the pain is described as burning and shooting. The pain radiates to the left foot. The pain is the same all the time. Stiffness is present all day. Associated symptoms include leg pain, numbness and tingling. Pertinent negatives include no abdominal pain, bladder incontinence, bowel incontinence, chest pain, dysuria, fever, headaches, paresis, paresthesias, pelvic pain, perianal numbness, weakness or weight loss.       Chronic back pain.  [place]  He continues on tramadol 50 mg once a day.  He does not take more than this.  His prescribers agreement is up-to-date.  His pharmacy log was reviewed.  He has had some recent exacerbations of back pain that are not as fast to get better.  He wants to go to a back specialist in [place] [facility] that a friend of his recommended.  Patient is going to call.  Otherwise his back pain is no change.  It is now better compared to a few weeks ago.  Back to his baseline.    He was diagnosed with COVID-19 about 3 weeks ago.  He had loss of sense of smell and taste.  He felt fatigued.  And then about a week later felt better.  He has had some exercise intolerance since then but it slowly improving.  His wife brought into the house.    Review of Systems   Constitutional: Negative for fever and unexpected weight loss.   Cardiovascular: Negative for chest pain.   Gastrointestinal: Negative for abdominal pain and bowel incontinence.   Genitourinary: Negative for dysuria, pelvic pain and urinary incontinence.   Musculoskeletal: Positive for back pain.   Neurological: Positive  "for tingling and numbness. Negative for weakness and paresthesias.     Objective   Vital Signs:   /91   Pulse 76   Temp 97.4 °F (36.3 °C) (Temporal)   Ht 188 cm (74.02\")   Wt 99.3 kg (219 lb)   SpO2 96%   BMI 28.11 kg/m²     Physical Exam  Constitutional:       Appearance: Normal appearance.   Cardiovascular:      Rate and Rhythm: Normal rate and regular rhythm.      Pulses: Normal pulses.   Pulmonary:      Effort: Pulmonary effort is normal. No respiratory distress.      Breath sounds: Normal breath sounds. No stridor. No wheezing, rhonchi or rales.   Skin:     Coloration: Skin is not pale.   Neurological:      Mental Status: He is alert.        Result Review :                 Assessment and Plan    Diagnoses and all orders for this visit:    1. Chronic low back pain, unspecified back pain laterality, unspecified whether sciatica present (Primary)      Chronic low back pain.  Recent exacerbation.  Patient wants to see a specialist at [facility].  He is going to make an appointment.  If he needs a referral he will let me know.  But this time he is at baseline.  Continue tramadol as is.  I will see him back in 6 months for annual complete physical examination with lab work prior.    COVID-19 URI syndrome, resolved.  Lungs are clear to auscultation today.  He is having some residual exercise intolerance.  Its not proving he will let us know.  He was on the Miguel & Miguel trial.  He thinks he received the placebo.  If he finds that he had a placebo, I would recommend a vaccine later this year.      Follow Up   No follow-ups on file.  Patient was given instructions and counseling regarding his condition or for health maintenance advice. Please see specific information pulled into the AVS if appropriate.           "

## 2021-03-30 ENCOUNTER — BULK ORDERING (OUTPATIENT)
Dept: CASE MANAGEMENT | Facility: OTHER | Age: 56
End: 2021-03-30

## 2021-03-30 DIAGNOSIS — Z23 IMMUNIZATION DUE: ICD-10-CM

## 2021-08-04 DIAGNOSIS — Z00.00 HEALTHCARE MAINTENANCE: ICD-10-CM

## 2021-08-04 DIAGNOSIS — M54.50 CHRONIC LOW BACK PAIN, UNSPECIFIED BACK PAIN LATERALITY, UNSPECIFIED WHETHER SCIATICA PRESENT: ICD-10-CM

## 2021-08-04 DIAGNOSIS — Z00.00 ANNUAL PHYSICAL EXAM: Primary | ICD-10-CM

## 2021-08-04 DIAGNOSIS — G89.29 CHRONIC LOW BACK PAIN, UNSPECIFIED BACK PAIN LATERALITY, UNSPECIFIED WHETHER SCIATICA PRESENT: ICD-10-CM

## 2021-08-04 RX ORDER — TRAMADOL HYDROCHLORIDE 50 MG/1
50 TABLET ORAL DAILY PRN
Qty: 90 TABLET | Refills: 1 | Status: SHIPPED | OUTPATIENT
Start: 2021-08-04 | End: 2022-01-31 | Stop reason: SDUPTHER

## 2021-08-05 LAB
ALBUMIN SERPL-MCNC: 4.5 G/DL (ref 3.5–5.2)
ALBUMIN/GLOB SERPL: 2.1 G/DL
ALP SERPL-CCNC: 52 U/L (ref 39–117)
ALT SERPL-CCNC: 18 U/L (ref 1–41)
AST SERPL-CCNC: 21 U/L (ref 1–40)
BASOPHILS # BLD AUTO: 0.08 10*3/MM3 (ref 0–0.2)
BASOPHILS NFR BLD AUTO: 2.2 % (ref 0–1.5)
BILIRUB SERPL-MCNC: 0.7 MG/DL (ref 0–1.2)
BUN SERPL-MCNC: 14 MG/DL (ref 6–20)
BUN/CREAT SERPL: 14.1 (ref 7–25)
CALCIUM SERPL-MCNC: 9.4 MG/DL (ref 8.6–10.5)
CHLORIDE SERPL-SCNC: 105 MMOL/L (ref 98–107)
CHOLEST SERPL-MCNC: 150 MG/DL (ref 0–200)
CO2 SERPL-SCNC: 26.5 MMOL/L (ref 22–29)
CREAT SERPL-MCNC: 0.99 MG/DL (ref 0.76–1.27)
EOSINOPHIL # BLD AUTO: 0.26 10*3/MM3 (ref 0–0.4)
EOSINOPHIL NFR BLD AUTO: 7 % (ref 0.3–6.2)
ERYTHROCYTE [DISTWIDTH] IN BLOOD BY AUTOMATED COUNT: 12.6 % (ref 12.3–15.4)
GLOBULIN SER CALC-MCNC: 2.1 GM/DL
GLUCOSE SERPL-MCNC: 98 MG/DL (ref 65–99)
HCT VFR BLD AUTO: 46.3 % (ref 37.5–51)
HCV AB S/CO SERPL IA: <0.1 S/CO RATIO (ref 0–0.9)
HDLC SERPL-MCNC: 55 MG/DL (ref 40–60)
HGB BLD-MCNC: 15.7 G/DL (ref 13–17.7)
IMM GRANULOCYTES # BLD AUTO: 0.01 10*3/MM3 (ref 0–0.05)
IMM GRANULOCYTES NFR BLD AUTO: 0.3 % (ref 0–0.5)
LDLC SERPL CALC-MCNC: 86 MG/DL (ref 0–100)
LYMPHOCYTES # BLD AUTO: 1.28 10*3/MM3 (ref 0.7–3.1)
LYMPHOCYTES NFR BLD AUTO: 34.7 % (ref 19.6–45.3)
MCH RBC QN AUTO: 32.5 PG (ref 26.6–33)
MCHC RBC AUTO-ENTMCNC: 33.9 G/DL (ref 31.5–35.7)
MCV RBC AUTO: 95.9 FL (ref 79–97)
MONOCYTES # BLD AUTO: 0.41 10*3/MM3 (ref 0.1–0.9)
MONOCYTES NFR BLD AUTO: 11.1 % (ref 5–12)
NEUTROPHILS # BLD AUTO: 1.65 10*3/MM3 (ref 1.7–7)
NEUTROPHILS NFR BLD AUTO: 44.7 % (ref 42.7–76)
NRBC BLD AUTO-RTO: 0 /100 WBC (ref 0–0.2)
PLATELET # BLD AUTO: 168 10*3/MM3 (ref 140–450)
POTASSIUM SERPL-SCNC: 4.8 MMOL/L (ref 3.5–5.2)
PROT SERPL-MCNC: 6.6 G/DL (ref 6–8.5)
RBC # BLD AUTO: 4.83 10*6/MM3 (ref 4.14–5.8)
SODIUM SERPL-SCNC: 140 MMOL/L (ref 136–145)
TRIGL SERPL-MCNC: 38 MG/DL (ref 0–150)
VLDLC SERPL CALC-MCNC: 9 MG/DL (ref 5–40)
WBC # BLD AUTO: 3.69 10*3/MM3 (ref 3.4–10.8)

## 2021-08-10 RX ORDER — SILDENAFIL 100 MG/1
TABLET, FILM COATED ORAL
Qty: 6 TABLET | Refills: 6 | Status: SHIPPED | OUTPATIENT
Start: 2021-08-10 | End: 2022-05-25

## 2021-08-11 ENCOUNTER — OFFICE VISIT (OUTPATIENT)
Dept: FAMILY MEDICINE CLINIC | Facility: CLINIC | Age: 56
End: 2021-08-11

## 2021-08-11 VITALS
TEMPERATURE: 96.9 F | SYSTOLIC BLOOD PRESSURE: 126 MMHG | HEIGHT: 74 IN | HEART RATE: 66 BPM | OXYGEN SATURATION: 97 % | WEIGHT: 215 LBS | DIASTOLIC BLOOD PRESSURE: 84 MMHG | BODY MASS INDEX: 27.59 KG/M2

## 2021-08-11 DIAGNOSIS — Z12.5 SCREENING PSA (PROSTATE SPECIFIC ANTIGEN): ICD-10-CM

## 2021-08-11 DIAGNOSIS — M54.50 CHRONIC LOW BACK PAIN, UNSPECIFIED BACK PAIN LATERALITY, UNSPECIFIED WHETHER SCIATICA PRESENT: ICD-10-CM

## 2021-08-11 DIAGNOSIS — Z00.00 HEALTHCARE MAINTENANCE: Primary | ICD-10-CM

## 2021-08-11 DIAGNOSIS — G89.29 CHRONIC LOW BACK PAIN, UNSPECIFIED BACK PAIN LATERALITY, UNSPECIFIED WHETHER SCIATICA PRESENT: ICD-10-CM

## 2021-08-11 PROCEDURE — 99396 PREV VISIT EST AGE 40-64: CPT | Performed by: FAMILY MEDICINE

## 2021-08-11 RX ORDER — DULOXETIN HYDROCHLORIDE 20 MG/1
20 CAPSULE, DELAYED RELEASE ORAL DAILY
Qty: 90 CAPSULE | Refills: 1 | Status: SHIPPED | OUTPATIENT
Start: 2021-08-11 | End: 2022-02-08

## 2021-08-11 NOTE — PROGRESS NOTES
"Chief Complaint  Annual Exam    Subjective          Delta Cormier presents to [facility]  History of Present Illness     Here for annual healthcare maintenance visit. He continues to do his back exercises. Non-smoker. Minimal alcohol use. Is view of systems is negative with exception of his ongoing back issues. He gets radiculopathy down the left leg. He went to [facility] had MRI done which reportedly showed multilevel disc disease. They recommend epidural steroid injections. Getting a second opinion from a different surgeon soon. He states the back pain has been more of an issue over the winter. And problematic recently. He continues tramadol once a day and 2 ibuprofen a day. Previously this was helping but now it's not quite working for him. He also has achy pain bilateral lower back and hips. He was told he had some hip arthritis when they did the x-rays at [facility]. Negative depression screen. Is otherwise doing well. He had COVID-19 earlier this year. He had his Miguel & Miguel vaccine in recent months.    Social History     Tobacco Use   • Smoking status: Never Smoker   • Smokeless tobacco: Never Used   Substance Use Topics   • Alcohol use: Yes     Comment: 3 a week   • Drug use: No         Objective   Vital Signs:   /84   Pulse 66   Temp 96.9 °F (36.1 °C) (Temporal)   Ht 188 cm (74.02\")   Wt 97.5 kg (215 lb)   SpO2 97%   BMI 27.59 kg/m²     Physical Exam  Vitals and nursing note reviewed.   Constitutional:       General: He is not in acute distress.  HENT:      Head: Normocephalic and atraumatic.      Right Ear: External ear normal.      Left Ear: External ear normal.   Eyes:      Pupils: Pupils are equal, round, and reactive to light.   Cardiovascular:      Rate and Rhythm: Normal rate and regular rhythm.   Pulmonary:      Effort: Pulmonary effort is normal.      Breath sounds: Normal breath sounds.   Abdominal:      General: Bowel sounds are normal. There is no " distension.      Palpations: Abdomen is soft. There is no mass.      Tenderness: There is no abdominal tenderness. There is no guarding.      Hernia: No hernia is present.   Genitourinary:     Prostate: Normal. Not enlarged and not tender.   Musculoskeletal:         General: No tenderness. Normal range of motion.      Cervical back: Normal range of motion and neck supple.   Skin:     General: Skin is warm and dry.   Neurological:      Mental Status: He is alert and oriented to person, place, and time.      Motor: No abnormal muscle tone.      Coordination: Coordination normal.   Psychiatric:         Behavior: Behavior normal.        Result Review :   The following data was reviewed by: Vasile Stock MD on 08/11/2021:  Common labs    Common Labsle 8/4/21 8/4/21 8/4/21    1024 1024 1024   Glucose  98    BUN  14    Creatinine  0.99    eGFR Non  Am  78    eGFR African Am  94    Sodium  140    Potassium  4.8    Chloride  105    Calcium  9.4    Total Protein  6.6    Albumin  4.50    Total Bilirubin  0.7    Alkaline Phosphatase  52    AST (SGOT)  21    ALT (SGPT)  18    WBC 3.69     Hemoglobin 15.7     Hematocrit 46.3     Platelets 168     Total Cholesterol   150   Triglycerides   38   HDL Cholesterol   55   LDL Cholesterol    86      Comments are available for some flowsheets but are not being displayed.                     Assessment and Plan    Diagnoses and all orders for this visit:    1. Healthcare maintenance (Primary)    2. Screening PSA (prostate specific antigen)  -     PSA Screen    3. Chronic low back pain, unspecified back pain laterality, unspecified whether sciatica present    Other orders  -     DULoxetine (CYMBALTA) 20 MG capsule; Take 1 capsule by mouth Daily.  Dispense: 90 capsule; Refill: 1      Annual healthcare maintenance visit.    Immunizations. Miguel & Miguel vaccine up-to-date. He also has probable partial natural immunity from Covid infection a number of months ago.    Colon cancer  screening up-to-date.    Prostate cancer screening. Ordering PSA today.    Chronic low back pain with exacerbation. Followed by back surgeons. Continue tramadol. Continue ibuprofen. I'm adding duloxetine 20 mg a day as an adjunct for chronic back pain. Patient not suffering from depression. Side effects discussed including GI upset. And possibly sexual dysfunction. He is going to let me know within 3 to 6 weeks whether it is helping him or not. Otherwise I'll see him in 6 months.    Preventative health practices discussed. Including regular exercise.      Follow Up   No follow-ups on file.  Patient was given instructions and counseling regarding his condition or for health maintenance advice. Please see specific information pulled into the AVS if appropriate.

## 2021-08-12 LAB — PSA SERPL-MCNC: 0.58 NG/ML (ref 0–4)

## 2022-01-31 DIAGNOSIS — G89.29 CHRONIC LOW BACK PAIN, UNSPECIFIED BACK PAIN LATERALITY, UNSPECIFIED WHETHER SCIATICA PRESENT: ICD-10-CM

## 2022-01-31 DIAGNOSIS — M54.50 CHRONIC LOW BACK PAIN, UNSPECIFIED BACK PAIN LATERALITY, UNSPECIFIED WHETHER SCIATICA PRESENT: ICD-10-CM

## 2022-01-31 RX ORDER — TRAMADOL HYDROCHLORIDE 50 MG/1
50 TABLET ORAL DAILY PRN
Qty: 90 TABLET | Refills: 1 | Status: SHIPPED | OUTPATIENT
Start: 2022-01-31 | End: 2022-07-27 | Stop reason: SDUPTHER

## 2022-01-31 NOTE — TELEPHONE ENCOUNTER
Rx Refill Note  Requested Prescriptions     Pending Prescriptions Disp Refills   • traMADol (ULTRAM) 50 MG tablet 90 tablet 1     Sig: Take 1 tablet by mouth Daily As Needed for Moderate Pain  or Severe Pain .      Last office visit with prescribing clinician: 8/11/2021      Next office visit with prescribing clinician: 2/8/2022            Alexandra Hoskins MA  01/31/22, 10:01 EST

## 2022-02-01 DIAGNOSIS — N28.9 KIDNEY FUNCTION ABNORMAL: Primary | ICD-10-CM

## 2022-02-01 DIAGNOSIS — E78.9 ABNORMAL SERUM CHOLESTEROL: ICD-10-CM

## 2022-02-01 DIAGNOSIS — N28.9 KIDNEY FUNCTION ABNORMAL: ICD-10-CM

## 2022-02-02 LAB
ALBUMIN SERPL-MCNC: 4.5 G/DL (ref 3.8–4.9)
ALBUMIN/GLOB SERPL: 2 {RATIO} (ref 1.2–2.2)
ALP SERPL-CCNC: 59 IU/L (ref 44–121)
ALT SERPL-CCNC: 24 IU/L (ref 0–44)
AST SERPL-CCNC: 20 IU/L (ref 0–40)
BILIRUB SERPL-MCNC: 0.5 MG/DL (ref 0–1.2)
BUN SERPL-MCNC: 15 MG/DL (ref 6–24)
BUN/CREAT SERPL: 15 (ref 9–20)
CALCIUM SERPL-MCNC: 9.4 MG/DL (ref 8.7–10.2)
CHLORIDE SERPL-SCNC: 103 MMOL/L (ref 96–106)
CHOLEST SERPL-MCNC: 169 MG/DL (ref 100–199)
CO2 SERPL-SCNC: 24 MMOL/L (ref 20–29)
CREAT SERPL-MCNC: 1.02 MG/DL (ref 0.76–1.27)
GLOBULIN SER CALC-MCNC: 2.3 G/DL (ref 1.5–4.5)
GLUCOSE SERPL-MCNC: 106 MG/DL (ref 65–99)
HDLC SERPL-MCNC: 54 MG/DL
LDLC SERPL CALC-MCNC: 106 MG/DL (ref 0–99)
LDLC/HDLC SERPL: 2 RATIO (ref 0–3.6)
POTASSIUM SERPL-SCNC: 4.7 MMOL/L (ref 3.5–5.2)
PROT SERPL-MCNC: 6.8 G/DL (ref 6–8.5)
SODIUM SERPL-SCNC: 141 MMOL/L (ref 134–144)
TRIGL SERPL-MCNC: 44 MG/DL (ref 0–149)
VLDLC SERPL CALC-MCNC: 9 MG/DL (ref 5–40)

## 2022-02-08 ENCOUNTER — OFFICE VISIT (OUTPATIENT)
Dept: FAMILY MEDICINE CLINIC | Facility: CLINIC | Age: 57
End: 2022-02-08

## 2022-02-08 VITALS
WEIGHT: 219.9 LBS | DIASTOLIC BLOOD PRESSURE: 84 MMHG | SYSTOLIC BLOOD PRESSURE: 130 MMHG | BODY MASS INDEX: 28.22 KG/M2 | OXYGEN SATURATION: 98 % | HEART RATE: 69 BPM | HEIGHT: 74 IN | TEMPERATURE: 96.9 F

## 2022-02-08 DIAGNOSIS — Z00.00 HEALTH CARE MAINTENANCE: ICD-10-CM

## 2022-02-08 DIAGNOSIS — M54.50 CHRONIC LOW BACK PAIN, UNSPECIFIED BACK PAIN LATERALITY, UNSPECIFIED WHETHER SCIATICA PRESENT: Primary | Chronic | ICD-10-CM

## 2022-02-08 DIAGNOSIS — Z12.5 SCREENING PSA (PROSTATE SPECIFIC ANTIGEN): ICD-10-CM

## 2022-02-08 DIAGNOSIS — G89.29 CHRONIC LOW BACK PAIN, UNSPECIFIED BACK PAIN LATERALITY, UNSPECIFIED WHETHER SCIATICA PRESENT: Primary | Chronic | ICD-10-CM

## 2022-02-08 PROCEDURE — 99213 OFFICE O/P EST LOW 20 MIN: CPT | Performed by: FAMILY MEDICINE

## 2022-02-08 NOTE — PROGRESS NOTES
"Chief Complaint  Back Pain (f/u )    Subjective          Delta Cormier presents to [facility]  History of Present Illness     Follow-up chronic low back pain.  He was seen by [place] [facility].  They had reportedly offered epidural steroid injections.  Patient declined at that time.  He is having some ongoing pain with occasional left radiculopathy down the left leg.  He is interested in going back to physical therapy.  He continues on tramadol once a day with occasional naproxen.  He did not start the duloxetine.  He does not have depression.  Was prescribed off label for chronic back pain.    Objective   Vital Signs:   /84   Pulse 69   Temp 96.9 °F (36.1 °C) (Temporal)   Ht 188 cm (74.02\")   Wt 99.7 kg (219 lb 14.4 oz)   SpO2 98%   BMI 28.22 kg/m²     Physical Exam  Constitutional:       Appearance: Normal appearance.   Musculoskeletal:      Comments: Low back no pain to palpation.  Bilateral hips good range of motion especially internal and external rotation.   Neurological:      Mental Status: He is alert.        Result Review :   The following data was reviewed by: Vasile Stock MD on 02/08/2022:  Common labs    Common Labsle 8/4/21 8/4/21 8/4/21 8/11/21 2/1/22 2/1/22    1024 1024 1024  0851 0851   Glucose  98    106 (A)   BUN  14    15   Creatinine  0.99    1.02   eGFR Non  Am  78    81   eGFR African Am  94    94   Sodium  140    141   Potassium  4.8    4.7   Chloride  105    103   Calcium  9.4    9.4   Total Protein  6.6    6.8   Albumin  4.50    4.5   Total Bilirubin  0.7    0.5   Alkaline Phosphatase  52    59   AST (SGOT)  21    20   ALT (SGPT)  18    24   WBC 3.69        Hemoglobin 15.7        Hematocrit 46.3        Platelets 168        Total Cholesterol   150  169    Triglycerides   38  44    HDL Cholesterol   55  54    LDL Cholesterol    86  106 (A)    PSA    0.578     (A) Abnormal value       Comments are available for some flowsheets but are " not being displayed.                     Assessment and Plan    Diagnoses and all orders for this visit:    1. Chronic low back pain, unspecified back pain laterality, unspecified whether sciatica present (Primary)  -     Ambulatory Referral to Physical Therapy Evaluate and treat  -     Comprehensive Metabolic Panel; Future  -     CBC & Differential; Future    2. Health care maintenance  -     Comprehensive Metabolic Panel; Future  -     CBC & Differential; Future  -     Lipid Panel; Future  -     PSA Screen; Future    3. Screening PSA (prostate specific antigen)  -     PSA Screen; Future      Chronic low back pain with left-sided sciatica.  Continue tramadol.  Continue naproxen.  Continue regular exercise.  Continue spin glasses 3 times a week.  Recommending physical therapy.  I will see him back in 6 months for annual complete physical examination lab work prior.      Follow Up   No follow-ups on file.  Patient was given instructions and counseling regarding his condition or for health maintenance advice. Please see specific information pulled into the AVS if appropriate.

## 2022-02-28 ENCOUNTER — TREATMENT (OUTPATIENT)
Dept: PHYSICAL THERAPY | Facility: CLINIC | Age: 57
End: 2022-02-28

## 2022-02-28 DIAGNOSIS — M54.42 CHRONIC LEFT-SIDED LOW BACK PAIN WITH LEFT-SIDED SCIATICA: Primary | ICD-10-CM

## 2022-02-28 DIAGNOSIS — G89.29 CHRONIC LEFT-SIDED LOW BACK PAIN WITH LEFT-SIDED SCIATICA: Primary | ICD-10-CM

## 2022-02-28 PROCEDURE — 97161 PT EVAL LOW COMPLEX 20 MIN: CPT | Performed by: PHYSICAL THERAPIST

## 2022-02-28 PROCEDURE — 97110 THERAPEUTIC EXERCISES: CPT | Performed by: PHYSICAL THERAPIST

## 2022-05-25 RX ORDER — SILDENAFIL 100 MG/1
TABLET, FILM COATED ORAL
Qty: 6 TABLET | Refills: 6 | Status: SHIPPED | OUTPATIENT
Start: 2022-05-25

## 2022-05-25 NOTE — TELEPHONE ENCOUNTER
Rx Refill Note  Requested Prescriptions     Pending Prescriptions Disp Refills   • sildenafil (VIAGRA) 100 MG tablet [Pharmacy Med Name: SILDENAFIL 100 MG TABLET] 6 tablet 6     Sig: TAKE ONE TABLET BY MOUTH DAILY AS NEEDED FOR ERECTILE DYSFUNCTION      Last office visit with prescribing clinician: 2/8/2022      Next office visit with prescribing clinician: 8/16/2022            Leyla Cruz  05/25/22, 10:48 EDT

## 2022-07-27 DIAGNOSIS — G89.29 CHRONIC LOW BACK PAIN, UNSPECIFIED BACK PAIN LATERALITY, UNSPECIFIED WHETHER SCIATICA PRESENT: ICD-10-CM

## 2022-07-27 DIAGNOSIS — M54.50 CHRONIC LOW BACK PAIN, UNSPECIFIED BACK PAIN LATERALITY, UNSPECIFIED WHETHER SCIATICA PRESENT: ICD-10-CM

## 2022-07-27 RX ORDER — TRAMADOL HYDROCHLORIDE 50 MG/1
50 TABLET ORAL DAILY PRN
Qty: 90 TABLET | Refills: 1 | Status: SHIPPED | OUTPATIENT
Start: 2022-07-27 | End: 2023-01-20 | Stop reason: SDUPTHER

## 2022-07-27 NOTE — TELEPHONE ENCOUNTER
Rx Refill Note  Requested Prescriptions     Pending Prescriptions Disp Refills   • traMADol (ULTRAM) 50 MG tablet 90 tablet 1     Sig: Take 1 tablet by mouth Daily As Needed for Moderate Pain  or Severe Pain .      Last office visit with prescribing clinician: 2/8/2022      Next office visit with prescribing clinician: 8/16/2022       {TIP  Please add Last Relevant Lab Date if appropriate: 2/1/22    Trish Ghosh, PCT  07/27/22, 14:38 EDT

## 2022-08-16 ENCOUNTER — OFFICE VISIT (OUTPATIENT)
Dept: FAMILY MEDICINE CLINIC | Facility: CLINIC | Age: 57
End: 2022-08-16

## 2022-08-16 VITALS
WEIGHT: 215.6 LBS | HEART RATE: 65 BPM | HEIGHT: 74 IN | TEMPERATURE: 96.9 F | DIASTOLIC BLOOD PRESSURE: 87 MMHG | BODY MASS INDEX: 27.67 KG/M2 | SYSTOLIC BLOOD PRESSURE: 128 MMHG | OXYGEN SATURATION: 98 %

## 2022-08-16 DIAGNOSIS — Z00.00 HEALTH CARE MAINTENANCE: Primary | ICD-10-CM

## 2022-08-16 DIAGNOSIS — M54.50 CHRONIC LOW BACK PAIN, UNSPECIFIED BACK PAIN LATERALITY, UNSPECIFIED WHETHER SCIATICA PRESENT: ICD-10-CM

## 2022-08-16 DIAGNOSIS — G89.29 CHRONIC LOW BACK PAIN, UNSPECIFIED BACK PAIN LATERALITY, UNSPECIFIED WHETHER SCIATICA PRESENT: ICD-10-CM

## 2022-08-16 PROCEDURE — 99396 PREV VISIT EST AGE 40-64: CPT | Performed by: FAMILY MEDICINE

## 2022-08-16 NOTE — PROGRESS NOTES
"Chief Complaint  Annual Exam    Subjective        Delta Cormier presents to [facility]  History of Present Illness     Here for annual healthcare maintenance visit.  Non-smoker.  No heavy alcohol use.  He is physically active.  Rides his bike and does other exercises.  His back pain has been overall stable.  Continues tramadol 1 tablet a day, usually in the evening.  He did go to physical therapy which helped.  He is also using ice on his back which the physical therapist recommended and he enjoys doing.  Reviewed his lipid panel.  He is at low cardiovascular risk, but getting towards intermediate risk zone.  His fasting glucose was slightly higher this year.  But no evidence of diabetes.  No GI or  symptoms.  Patient declines prostate examination this year.    The 10-year ASCVD risk score (Alejandra FLOWERS Jr., et al., 2013) is: 5.7%    Values used to calculate the score:      Age: 57 years      Sex: Male      Is Non- : No      Diabetic: No      Tobacco smoker: No      Systolic Blood Pressure: 128 mmHg      Is BP treated: No      HDL Cholesterol: 54 mg/dL      Total Cholesterol: 167 mg/dL      Objective   Vital Signs:  /87   Pulse 65   Temp 96.9 °F (36.1 °C) (Temporal)   Ht 188 cm (74.02\")   Wt 97.8 kg (215 lb 9.6 oz)   SpO2 98%   BMI 27.67 kg/m²   Estimated body mass index is 27.67 kg/m² as calculated from the following:    Height as of this encounter: 188 cm (74.02\").    Weight as of this encounter: 97.8 kg (215 lb 9.6 oz).          Physical Exam  Constitutional:       Appearance: Normal appearance.   HENT:      Head: Atraumatic.      Mouth/Throat:      Pharynx: Oropharynx is clear. No oropharyngeal exudate or posterior oropharyngeal erythema.   Eyes:      Conjunctiva/sclera: Conjunctivae normal.   Neck:      Thyroid: No thyroid mass, thyromegaly or thyroid tenderness.      Comments: No head neck adenopathy or mass.  Cardiovascular:      Rate and " Rhythm: Normal rate and regular rhythm.      Pulses: Normal pulses.      Heart sounds: Normal heart sounds.   Pulmonary:      Effort: Pulmonary effort is normal.      Breath sounds: Normal breath sounds.   Abdominal:      General: Abdomen is flat. There is no distension.      Palpations: Abdomen is soft. There is no mass.      Tenderness: There is no abdominal tenderness.      Hernia: No hernia is present.   Musculoskeletal:         General: Normal range of motion.      Cervical back: Normal range of motion and neck supple. No muscular tenderness.   Lymphadenopathy:      Cervical: No cervical adenopathy.   Skin:     General: Skin is warm and dry.      Findings: No rash.   Neurological:      General: No focal deficit present.      Mental Status: He is alert and oriented to person, place, and time.   Psychiatric:         Mood and Affect: Mood normal.        Result Review :  The following data was reviewed by: Vasile Stock MD on 08/16/2022:  Common labs    Common Labsle 2/1/22 2/1/22 8/9/22 8/9/22 8/9/22 8/9/22    0851 0851 0814 0814 0814 0814   Glucose  106 (A) 109 (A)      BUN  15 18      Creatinine  1.02 1.09      eGFR Non  Am  81       eGFR African Am  94       Sodium  141 141      Potassium  4.7 4.9      Chloride  103 103      Calcium  9.4 9.4      Total Protein  6.8 6.6      Albumin  4.5 4.6      Total Bilirubin  0.5 0.6      Alkaline Phosphatase  59 60      AST (SGOT)  20 26      ALT (SGPT)  24 30      WBC    4.0     Hemoglobin    16.0     Hematocrit    45.7     Platelets    186     Total Cholesterol 169    167    Triglycerides 44    45    HDL Cholesterol 54    54    LDL Cholesterol  106 (A)    104 (A)    PSA      0.5   (A) Abnormal value       Comments are available for some flowsheets but are not being displayed.                     Assessment and Plan   Diagnoses and all orders for this visit:    1. Health care maintenance (Primary)  -     Hemoglobin A1c; Future  -     Comprehensive Metabolic  Panel; Future  -     Lipid Panel; Future    2. Chronic low back pain, unspecified back pain laterality, unspecified whether sciatica present  -     Pain Management Profile (13 Drugs) Urine - Urine, Clean Catch      Annual healthcare maintenance visit.    Immunizations.  I do recommend the third COVID-19 vaccine today.  Patient wants to wait until this fall when he can get the omicron vaccine.    Chronic back pain.  Chronic tramadol use.  Renew prescribers agreement today.  Ordering routine drug screen per protocol.  I will see him back in 6 months for recheck with labs prior.    Colon cancer screening up-to-date.    Prostate cancer screening.  PSA level remains low.  Patient declined prostate exam today.  No  symptoms.    Preventative health practices discussed including regular exercise.  I will see him in 6 months as above.    At risk for diabetes.  We will monitor his glucose and check A1c prior to next visit.    Low to borderline intermediate cardiovascular risk.  We will continue to monitor his lipid panel.  He will continue to exercise.           Follow Up   No follow-ups on file.  Patient was given instructions and counseling regarding his condition or for health maintenance advice. Please see specific information pulled into the AVS if appropriate.

## 2022-08-25 LAB
AMPHETAMINES UR QL SCN: NEGATIVE NG/ML
BARBITURATES UR QL SCN: NEGATIVE NG/ML
BENZODIAZ UR QL SCN: NEGATIVE NG/ML
BZE UR QL SCN: NEGATIVE NG/ML
CANNABINOIDS UR QL SCN: NEGATIVE NG/ML
CREAT UR-MCNC: 72.2 MG/DL (ref 20–300)
FENTANYL UR-MCNC: NEGATIVE PG/ML
LABORATORY COMMENT REPORT: ABNORMAL
MEPERIDINE UR QL: NEGATIVE NG/ML
METHADONE UR QL SCN: NEGATIVE NG/ML
OPIATES UR QL SCN: NEGATIVE NG/ML
OXYCODONE+OXYMORPHONE UR QL SCN: NEGATIVE NG/ML
PCP UR QL: NEGATIVE NG/ML
PH UR: 5.5 [PH] (ref 4.5–8.9)
PROPOXYPH UR QL SCN: NEGATIVE NG/ML
SP GR UR: 1.01
TRAMADOL UR-MCNC: POSITIVE UG/ML

## 2022-11-02 ENCOUNTER — FLU SHOT (OUTPATIENT)
Dept: FAMILY MEDICINE CLINIC | Facility: CLINIC | Age: 57
End: 2022-11-02

## 2022-11-02 DIAGNOSIS — Z23 INFLUENZA VACCINE ADMINISTERED: Primary | ICD-10-CM

## 2022-11-02 PROCEDURE — 90471 IMMUNIZATION ADMIN: CPT | Performed by: FAMILY MEDICINE

## 2022-11-02 PROCEDURE — 90686 IIV4 VACC NO PRSV 0.5 ML IM: CPT | Performed by: FAMILY MEDICINE

## 2023-01-20 DIAGNOSIS — G89.29 CHRONIC LOW BACK PAIN, UNSPECIFIED BACK PAIN LATERALITY, UNSPECIFIED WHETHER SCIATICA PRESENT: ICD-10-CM

## 2023-01-20 DIAGNOSIS — M54.50 CHRONIC LOW BACK PAIN, UNSPECIFIED BACK PAIN LATERALITY, UNSPECIFIED WHETHER SCIATICA PRESENT: ICD-10-CM

## 2023-01-20 RX ORDER — TRAMADOL HYDROCHLORIDE 50 MG/1
50 TABLET ORAL DAILY PRN
Qty: 90 TABLET | Refills: 1 | OUTPATIENT
Start: 2023-01-20

## 2023-01-20 NOTE — TELEPHONE ENCOUNTER
Rx Refill Note  Requested Prescriptions     Pending Prescriptions Disp Refills   • traMADol (ULTRAM) 50 MG tablet 90 tablet 1     Sig: Take 1 tablet by mouth Daily As Needed for Moderate Pain or Severe Pain.      Last office visit with prescribing clinician: 8/16/2022   Last telemedicine visit with prescribing clinician: 2/21/2023   Next office visit with prescribing clinician: 2/21/2023       {TIP  Please add Last Relevant Lab Date if appropriate: 08/16/22                 Would you like a call back once the refill request has been completed: [] Yes [] No    If the office needs to give you a call back, can they leave a voicemail: [] Yes [] No    Tiana Fischer MA  01/20/23, 17:11 EST

## 2023-01-23 RX ORDER — TRAMADOL HYDROCHLORIDE 50 MG/1
50 TABLET ORAL DAILY PRN
Qty: 90 TABLET | Refills: 1 | Status: SHIPPED | OUTPATIENT
Start: 2023-01-23

## 2023-01-25 DIAGNOSIS — M54.50 CHRONIC LOW BACK PAIN, UNSPECIFIED BACK PAIN LATERALITY, UNSPECIFIED WHETHER SCIATICA PRESENT: ICD-10-CM

## 2023-01-25 DIAGNOSIS — G89.29 CHRONIC LOW BACK PAIN, UNSPECIFIED BACK PAIN LATERALITY, UNSPECIFIED WHETHER SCIATICA PRESENT: ICD-10-CM

## 2023-01-25 RX ORDER — TRAMADOL HYDROCHLORIDE 50 MG/1
TABLET ORAL
Qty: 90 TABLET | OUTPATIENT
Start: 2023-01-25

## 2023-02-21 ENCOUNTER — OFFICE VISIT (OUTPATIENT)
Dept: FAMILY MEDICINE CLINIC | Facility: CLINIC | Age: 58
End: 2023-02-21
Payer: COMMERCIAL

## 2023-02-21 VITALS
BODY MASS INDEX: 27.81 KG/M2 | OXYGEN SATURATION: 99 % | SYSTOLIC BLOOD PRESSURE: 129 MMHG | WEIGHT: 216.7 LBS | HEART RATE: 74 BPM | HEIGHT: 74 IN | DIASTOLIC BLOOD PRESSURE: 91 MMHG | TEMPERATURE: 97.1 F

## 2023-02-21 DIAGNOSIS — M54.50 CHRONIC LOW BACK PAIN, UNSPECIFIED BACK PAIN LATERALITY, UNSPECIFIED WHETHER SCIATICA PRESENT: Primary | Chronic | ICD-10-CM

## 2023-02-21 DIAGNOSIS — Z00.00 HEALTH CARE MAINTENANCE: ICD-10-CM

## 2023-02-21 DIAGNOSIS — G89.29 CHRONIC LOW BACK PAIN, UNSPECIFIED BACK PAIN LATERALITY, UNSPECIFIED WHETHER SCIATICA PRESENT: Primary | Chronic | ICD-10-CM

## 2023-02-21 DIAGNOSIS — Z12.5 SCREENING PSA (PROSTATE SPECIFIC ANTIGEN): ICD-10-CM

## 2023-02-21 PROCEDURE — 99213 OFFICE O/P EST LOW 20 MIN: CPT | Performed by: FAMILY MEDICINE

## 2023-02-21 NOTE — PROGRESS NOTES
"Chief Complaint  Back Pain (Low back pain )    Subjective        Delta VARUN Cormier presents to [facility]  History of Present Illness       Low back pain follow-up.  Continues on tramadol at reasonable doses.  Occasional Aleve.  Blood pressure slightly elevated today.  No NSAIDs in 3 days.  He is otherwise feeling well.    Objective   Vital Signs:  /91   Pulse 74   Temp 97.1 °F (36.2 °C) (Temporal)   Ht 188 cm (74.01\")   Wt 98.3 kg (216 lb 11.2 oz)   SpO2 99%   BMI 27.81 kg/m²   Estimated body mass index is 27.81 kg/m² as calculated from the following:    Height as of this encounter: 188 cm (74.01\").    Weight as of this encounter: 98.3 kg (216 lb 11.2 oz).                        Physical Exam  Vitals and nursing note reviewed.   Constitutional:       General: He is not in acute distress.     Appearance: He is well-developed.   Cardiovascular:      Rate and Rhythm: Normal rate and regular rhythm.      Heart sounds: Normal heart sounds.   Pulmonary:      Effort: Pulmonary effort is normal.      Breath sounds: Normal breath sounds.   Musculoskeletal:      Cervical back: Normal range of motion.   Skin:     General: Skin is warm and dry.   Psychiatric:         Mood and Affect: Mood normal.        Result Review :  The following data was reviewed by: Vasile Stock MD on 02/21/2023:  Common labs    Common Labs 8/9/22 8/9/22 8/9/22 8/9/22    0814 0814 0814 0814   Glucose 109 (A)      BUN 18      Creatinine 1.09      Sodium 141      Potassium 4.9      Chloride 103      Calcium 9.4      Total Protein 6.6      Albumin 4.6      Total Bilirubin 0.6      Alkaline Phosphatase 60      AST (SGOT) 26      ALT (SGPT) 30      WBC  4.0     Hemoglobin  16.0     Hematocrit  45.7     Platelets  186     Total Cholesterol   167    Triglycerides   45    HDL Cholesterol   54    LDL Cholesterol    104 (A)    PSA    0.5   (A) Abnormal value       Comments are available for some flowsheets but are not " being displayed.                        Assessment and Plan   Diagnoses and all orders for this visit:    1. Chronic low back pain, unspecified back pain laterality, unspecified whether sciatica present (Primary)    2. Health care maintenance  -     Comprehensive Metabolic Panel; Future  -     CBC & Differential; Future  -     Lipid Panel; Future  -     PSA Screen; Future  -     Hemoglobin A1c; Future    3. Screening PSA (prostate specific antigen)  -     PSA Screen; Future      Follow-up on chronic back pain.  Continues tramadol.  Blood pressure slightly elevated today.  He is going to continue to limit NSAIDs.  Prescribers agreement is up-to-date.  I will see him back in 6 months for annual complete physical examination with lab work prior.  We will continue to monitor his borderline fasting glucose elevations.  And his borderline blood pressure readings.  I have asked him to check his blood pressure at home see discharge instructions         Follow Up   No follow-ups on file.  Patient was given instructions and counseling regarding his condition or for health maintenance advice. Please see specific information pulled into the AVS if appropriate.

## 2023-02-21 NOTE — PATIENT INSTRUCTIONS
Great seeing you today.  Continue your back pain medicine as is.    Your blood pressure slightly high today.  Your glucose levels are borderline high.  I recommend you have blood work done prior to next visit in 6 months.  In the meantime continue activity.  Continue watching your diet.    I would like you to check your blood pressure on a regular basis for the next 3 to 4 weeks.  Check it once or twice a day or even 2 or 3 times a week.  Make sure you sit 5 minutes first.  And then if they look like they are running high send me copies on the [employer] messages either typed them in or a PDF attachment.  They should be 120s over 80s or less.

## 2023-04-17 DIAGNOSIS — G89.29 CHRONIC LOW BACK PAIN, UNSPECIFIED BACK PAIN LATERALITY, UNSPECIFIED WHETHER SCIATICA PRESENT: ICD-10-CM

## 2023-04-17 DIAGNOSIS — M54.50 CHRONIC LOW BACK PAIN, UNSPECIFIED BACK PAIN LATERALITY, UNSPECIFIED WHETHER SCIATICA PRESENT: ICD-10-CM

## 2023-04-17 RX ORDER — TRAMADOL HYDROCHLORIDE 50 MG/1
50 TABLET ORAL DAILY PRN
Qty: 90 TABLET | Refills: 1 | Status: SHIPPED | OUTPATIENT
Start: 2023-04-17

## 2023-04-17 NOTE — TELEPHONE ENCOUNTER
Rx Refill Note  Requested Prescriptions     Pending Prescriptions Disp Refills   • traMADol (ULTRAM) 50 MG tablet 90 tablet 1     Sig: Take 1 tablet by mouth Daily As Needed for Moderate Pain or Severe Pain.      Last office visit with prescribing clinician: 2/21/2023   Last telemedicine visit with prescribing clinician: 8/15/2023   Next office visit with prescribing clinician: 8/22/2023                         Would you like a call back once the refill request has been completed: [] Yes [] No    If the office needs to give you a call back, can they leave a voicemail: [] Yes [] No    Leyla Cruz  04/17/23, 10:51 EDT

## 2023-04-21 ENCOUNTER — TELEPHONE (OUTPATIENT)
Dept: FAMILY MEDICINE CLINIC | Facility: CLINIC | Age: 58
End: 2023-04-21
Payer: COMMERCIAL

## 2023-04-21 NOTE — TELEPHONE ENCOUNTER
Delta called regarding med traMADol (ULTRAM) 50 MG tablet. Prescription was sent on 4/17/23 to [facility] they will not fill until approval from provider it is ok to fill early. Pt leaving to go out of town today at 6 pm    Provided info to MA

## 2023-08-22 ENCOUNTER — OFFICE VISIT (OUTPATIENT)
Dept: FAMILY MEDICINE CLINIC | Facility: CLINIC | Age: 58
End: 2023-08-22
Payer: COMMERCIAL

## 2023-08-22 VITALS
BODY MASS INDEX: 27.82 KG/M2 | TEMPERATURE: 97.1 F | OXYGEN SATURATION: 96 % | HEIGHT: 74 IN | HEART RATE: 77 BPM | SYSTOLIC BLOOD PRESSURE: 122 MMHG | DIASTOLIC BLOOD PRESSURE: 85 MMHG | WEIGHT: 216.8 LBS

## 2023-08-22 DIAGNOSIS — Z00.00 HEALTH CARE MAINTENANCE: Primary | ICD-10-CM

## 2023-08-22 DIAGNOSIS — Z12.5 SCREENING PSA (PROSTATE SPECIFIC ANTIGEN): ICD-10-CM

## 2023-08-22 PROCEDURE — 99396 PREV VISIT EST AGE 40-64: CPT | Performed by: FAMILY MEDICINE

## 2023-08-22 NOTE — PROGRESS NOTES
"Chief Complaint  Annual Exam    Subjective        Delta Cormier presents to [facility]  History of Present Illness    Annual healthcare maintenance visit.  Non-smoker.  No heavy alcohol use.  Getting some exercise including golfing.  He has ongoing low back pain which is overall stable but has had some osteoarthritis pain.  Especially the left hand.  No constitutional symptoms.  No brain fog.  No trouble with sleep for the most part.  He also takes Aleve.  About 1 a day.  And tramadol once a day.  No chronic fatigue.  Reviewed lab work.  Lipid panel favorable.  Confers probable low cardiovascular risk.  He has had some mild impaired fasting glucose but his A1c is low normal.    Social History     Tobacco Use    Smoking status: Never    Smokeless tobacco: Never   Substance Use Topics    Alcohol use: Yes     Comment: 3 a week    Drug use: No           Objective   Vital Signs:  /85   Pulse 77   Temp 97.1 øF (36.2 øC) (Temporal)   Ht 188 cm (74.01\")   Wt 98.3 kg (216 lb 12.8 oz)   SpO2 96%   BMI 27.83 kg/mý   Estimated body mass index is 27.83 kg/mý as calculated from the following:    Height as of this encounter: 188 cm (74.01\").    Weight as of this encounter: 98.3 kg (216 lb 12.8 oz).             Physical Exam  Constitutional:       Appearance: Normal appearance.   HENT:      Head: Atraumatic.      Mouth/Throat:      Pharynx: Oropharynx is clear. No oropharyngeal exudate or posterior oropharyngeal erythema.   Eyes:      Conjunctiva/sclera: Conjunctivae normal.   Neck:      Thyroid: No thyroid mass, thyromegaly or thyroid tenderness.   Cardiovascular:      Rate and Rhythm: Normal rate and regular rhythm.      Pulses: Normal pulses.      Heart sounds: Normal heart sounds.   Pulmonary:      Effort: Pulmonary effort is normal.      Breath sounds: Normal breath sounds.   Abdominal:      General: Abdomen is flat. There is no distension.      Palpations: Abdomen is soft. There is " no mass.      Tenderness: There is no abdominal tenderness.      Hernia: No hernia is present.   Genitourinary:     Comments: Prostate exam unremarkable.  Musculoskeletal:         General: Normal range of motion.      Cervical back: Normal range of motion and neck supple. No muscular tenderness.   Lymphadenopathy:      Cervical: No cervical adenopathy.   Skin:     General: Skin is warm and dry.      Findings: No rash.   Neurological:      General: No focal deficit present.      Mental Status: He is alert and oriented to person, place, and time.   Psychiatric:         Mood and Affect: Mood normal.      Result Review :  The following data was reviewed by: Vasile Stock MD on 08/22/2023:  Common labs          8/15/2023    08:26   Common Labs   Glucose 106    BUN 16    Creatinine 1.03    Sodium 140    Potassium 4.5    Chloride 106    Calcium 9.0    Total Protein 6.0    Albumin 4.3    Total Bilirubin 0.4    Alkaline Phosphatase 55    AST (SGOT) 19    ALT (SGPT) 20    Total Cholesterol 152    Triglycerides 43    HDL Cholesterol 54    LDL Cholesterol  88    Hemoglobin A1C 5.10                   Assessment and Plan   Diagnoses and all orders for this visit:    1. Health care maintenance (Primary)    2. Screening PSA (prostate specific antigen)  -     PSA Screen      Annual healthcare maintenance visit.    Immunizations.  I recommend a COVID up-to-date booster.    Colon cancer screening up-to-date.    Prostate cancer screening up-to-date.  Discussed the risks benefits and limitations of breast cancer screening.    Chronic pain.  Low back pain.  Continues tramadol and Aleve once a day.  If getting much worse he is going to let us know.    Impaired fasting glucose.  No evidence of progression of prediabetes or diabetes.    Other preventative health practices discussed including regular exercise.  See me in 6 months sooner as needed         Follow Up   No follow-ups on file.  Patient was given instructions and counseling  regarding his condition or for health maintenance advice. Please see specific information pulled into the AVS if appropriate.

## 2023-08-23 LAB — PSA SERPL-MCNC: 0.5 NG/ML (ref 0–4)

## 2023-10-17 DIAGNOSIS — G89.29 CHRONIC LOW BACK PAIN, UNSPECIFIED BACK PAIN LATERALITY, UNSPECIFIED WHETHER SCIATICA PRESENT: ICD-10-CM

## 2023-10-17 DIAGNOSIS — M54.50 CHRONIC LOW BACK PAIN, UNSPECIFIED BACK PAIN LATERALITY, UNSPECIFIED WHETHER SCIATICA PRESENT: ICD-10-CM

## 2023-10-17 RX ORDER — TRAMADOL HYDROCHLORIDE 50 MG/1
50 TABLET ORAL DAILY PRN
Qty: 90 TABLET | Refills: 1 | Status: SHIPPED | OUTPATIENT
Start: 2023-10-17

## 2023-10-17 NOTE — TELEPHONE ENCOUNTER
Rx Refill Note  Requested Prescriptions     Pending Prescriptions Disp Refills    traMADol (ULTRAM) 50 MG tablet 90 tablet 1     Sig: Take 1 tablet by mouth Daily As Needed for Moderate Pain or Severe Pain.      Last office visit with prescribing clinician: 8/22/2023   Last telemedicine visit with prescribing clinician: Visit date not found   Next office visit with prescribing clinician: 2/22/2024                         Would you like a call back once the refill request has been completed: [] Yes [] No    If the office needs to give you a call back, can they leave a voicemail: [] Yes [] No    Franco Ervin MA  10/17/23, 11:09 EDT

## 2023-10-31 ENCOUNTER — FLU SHOT (OUTPATIENT)
Dept: FAMILY MEDICINE CLINIC | Facility: CLINIC | Age: 58
End: 2023-10-31
Payer: COMMERCIAL

## 2023-10-31 DIAGNOSIS — Z23 INFLUENZA VACCINE ADMINISTERED: Primary | ICD-10-CM

## 2024-01-10 ENCOUNTER — TELEPHONE (OUTPATIENT)
Dept: FAMILY MEDICINE CLINIC | Facility: CLINIC | Age: 59
End: 2024-01-10

## 2024-01-10 NOTE — TELEPHONE ENCOUNTER
Caller: Delta Cormier    Relationship: Self    Best call back number: [phone]    What was the call regarding: PATIENT WOULD LIKE TO COME IN FOR A TDAP VACCINE.    PLEASE CALL TO SCHEDULE.

## 2024-02-22 ENCOUNTER — OFFICE VISIT (OUTPATIENT)
Dept: FAMILY MEDICINE CLINIC | Facility: CLINIC | Age: 59
End: 2024-02-22
Payer: COMMERCIAL

## 2024-02-22 VITALS
WEIGHT: 220 LBS | DIASTOLIC BLOOD PRESSURE: 80 MMHG | HEIGHT: 74 IN | HEART RATE: 83 BPM | OXYGEN SATURATION: 96 % | SYSTOLIC BLOOD PRESSURE: 120 MMHG | RESPIRATION RATE: 12 BRPM | BODY MASS INDEX: 28.23 KG/M2 | TEMPERATURE: 97.3 F

## 2024-02-22 DIAGNOSIS — M54.50 CHRONIC LOW BACK PAIN, UNSPECIFIED BACK PAIN LATERALITY, UNSPECIFIED WHETHER SCIATICA PRESENT: Primary | Chronic | ICD-10-CM

## 2024-02-22 DIAGNOSIS — Z00.00 HEALTH CARE MAINTENANCE: ICD-10-CM

## 2024-02-22 DIAGNOSIS — G89.29 CHRONIC LOW BACK PAIN, UNSPECIFIED BACK PAIN LATERALITY, UNSPECIFIED WHETHER SCIATICA PRESENT: Primary | Chronic | ICD-10-CM

## 2024-02-22 DIAGNOSIS — Z12.5 SCREENING PSA (PROSTATE SPECIFIC ANTIGEN): ICD-10-CM

## 2024-02-22 NOTE — PROGRESS NOTES
"Chief Complaint  Chronic back pain    Subjective        Delta Cormier presents to [facility]  History of Present Illness    Follow-up chronic back pain.  He continues on tramadol 50 mg daily at about 11:00.  He also takes Aleve once or twice a day.  This seems to help along with the ice stretches and core exercise.  He had MRI states about 3 years ago with his back surgeon.  He has no myelopathy symptoms.  No saddle anesthesia.  No urinary incontinence.  Otherwise doing well.  He states no other medication other than what was listed on the chart.  No THC or other drug use.    Objective   Vital Signs:  /80   Pulse 83   Temp 97.3 °F (36.3 °C) (Temporal)   Resp 12   Ht 188 cm (74.01\")   Wt 99.8 kg (220 lb)   SpO2 96%   BMI 28.24 kg/m²   Estimated body mass index is 28.24 kg/m² as calculated from the following:    Height as of this encounter: 188 cm (74.01\").    Weight as of this encounter: 99.8 kg (220 lb).             Physical Exam  Vitals and nursing note reviewed.   Constitutional:       General: He is not in acute distress.     Appearance: He is well-developed.   Cardiovascular:      Rate and Rhythm: Normal rate and regular rhythm.      Heart sounds: Normal heart sounds.   Pulmonary:      Effort: Pulmonary effort is normal.      Breath sounds: Normal breath sounds.   Skin:     General: Skin is warm and dry.   Psychiatric:         Mood and Affect: Mood normal.        Result Review :    The following data was reviewed by: Vasile Stock MD on 02/22/2024:  Common labs          8/15/2023    08:26 8/22/2023    10:02   Common Labs   Glucose 106     BUN 16     Creatinine 1.03     Sodium 140     Potassium 4.5     Chloride 106     Calcium 9.0     Total Protein 6.0     Albumin 4.3     Total Bilirubin 0.4     Alkaline Phosphatase 55     AST (SGOT) 19     ALT (SGPT) 20     Total Cholesterol 152     Triglycerides 43     HDL Cholesterol 54     LDL Cholesterol  88     Hemoglobin A1C " 5.10     PSA  0.503                   Assessment and Plan     Diagnoses and all orders for this visit:    1. Chronic low back pain, unspecified back pain laterality, unspecified whether sciatica present (Primary)    2. Health care maintenance  -     Hemoglobin A1c; Future  -     CBC & Differential; Future  -     Comprehensive Metabolic Panel; Future  -     Lipid Panel; Future  -     PSA Screen; Future    3. Screening PSA (prostate specific antigen)  -     PSA Screen; Future      Chronic back pain.  He is doing well with tramadol once a day along with NSAID use.  He is taking 1 over-the-counter Aleve daily.  Sometimes little bit more.  No red flag behavior.  No depression.  I will see him back in 6 months for annual complete physical examination with lab work prior.  I counseled him today about chronic pain and chronic pain management.         Follow Up     No follow-ups on file.  Patient was given instructions and counseling regarding his condition or for health maintenance advice. Please see specific information pulled into the AVS if appropriate.

## 2024-04-16 DIAGNOSIS — M54.50 CHRONIC LOW BACK PAIN, UNSPECIFIED BACK PAIN LATERALITY, UNSPECIFIED WHETHER SCIATICA PRESENT: ICD-10-CM

## 2024-04-16 DIAGNOSIS — G89.29 CHRONIC LOW BACK PAIN, UNSPECIFIED BACK PAIN LATERALITY, UNSPECIFIED WHETHER SCIATICA PRESENT: ICD-10-CM

## 2024-04-16 RX ORDER — SILDENAFIL 100 MG/1
100 TABLET, FILM COATED ORAL DAILY PRN
Qty: 6 TABLET | Refills: 6 | Status: SHIPPED | OUTPATIENT
Start: 2024-04-16

## 2024-04-16 RX ORDER — TRAMADOL HYDROCHLORIDE 50 MG/1
50 TABLET ORAL DAILY PRN
Qty: 90 TABLET | Refills: 1 | Status: SHIPPED | OUTPATIENT
Start: 2024-04-16

## 2024-04-16 NOTE — TELEPHONE ENCOUNTER
Rx Refill Note  Requested Prescriptions     Pending Prescriptions Disp Refills    sildenafil (VIAGRA) 100 MG tablet 6 tablet 6     Sig: Take 1 tablet by mouth Daily As Needed for Erectile Dysfunction.    traMADol (ULTRAM) 50 MG tablet 90 tablet 1     Sig: Take 1 tablet by mouth Daily As Needed for Moderate Pain or Severe Pain.      Last office visit with prescribing clinician: 2/22/2024   Last telemedicine visit with prescribing clinician: Visit date not found   Next office visit with prescribing clinician: 8/28/2024                         Would you like a call back once the refill request has been completed: [] Yes [] No    If the office needs to give you a call back, can they leave a voicemail: [] Yes [] No    Leyla Cruz  04/16/24, 12:12 EDT

## 2024-08-28 ENCOUNTER — OFFICE VISIT (OUTPATIENT)
Dept: FAMILY MEDICINE CLINIC | Facility: CLINIC | Age: 59
End: 2024-08-28
Payer: COMMERCIAL

## 2024-08-28 VITALS
BODY MASS INDEX: 26.86 KG/M2 | DIASTOLIC BLOOD PRESSURE: 89 MMHG | OXYGEN SATURATION: 98 % | WEIGHT: 209.3 LBS | TEMPERATURE: 97.3 F | SYSTOLIC BLOOD PRESSURE: 123 MMHG | HEART RATE: 69 BPM | HEIGHT: 74 IN

## 2024-08-28 DIAGNOSIS — G89.29 CHRONIC LOW BACK PAIN, UNSPECIFIED BACK PAIN LATERALITY, UNSPECIFIED WHETHER SCIATICA PRESENT: ICD-10-CM

## 2024-08-28 DIAGNOSIS — Z00.00 HEALTH CARE MAINTENANCE: Primary | ICD-10-CM

## 2024-08-28 DIAGNOSIS — M54.50 CHRONIC LOW BACK PAIN, UNSPECIFIED BACK PAIN LATERALITY, UNSPECIFIED WHETHER SCIATICA PRESENT: ICD-10-CM

## 2024-08-28 PROCEDURE — 99396 PREV VISIT EST AGE 40-64: CPT | Performed by: FAMILY MEDICINE

## 2024-08-28 PROCEDURE — 99213 OFFICE O/P EST LOW 20 MIN: CPT | Performed by: FAMILY MEDICINE

## 2024-08-28 RX ORDER — FLUTICASONE PROPIONATE 50 MCG
2 SPRAY, SUSPENSION (ML) NASAL DAILY
COMMUNITY

## 2024-08-28 NOTE — PROGRESS NOTES
"Chief Complaint  Annual Exam    Subjective        Delta Cormier presents to [facility]  History of Present Illness    Annual healthcare maintenance visit.  Non-smoker.  No heavy alcohol use.  He retired recently.  He is lost about 11 pounds through diet and exercise.  Watching his diet closely.  Exercising more on his bike and elsewhere.  He has no concerns about his health otherwise.  No concerning  symptoms.  He continues tramadol 50 mg once a day for chronic back pain.  His prescribers agreement will be renewed today.  His urine drug screen in the last year or 2 was consistent with therapy.  No red flag behavior.  Reviewed lipid panel and other lab work.  He continues to have borderline elevated fasting glucose.  However his A1c is normal.    Objective   Vital Signs:  /89   Pulse 69   Temp 97.3 °F (36.3 °C) (Temporal)   Ht 188 cm (74.01\")   Wt 94.9 kg (209 lb 4.8 oz)   SpO2 98%   BMI 26.87 kg/m²   Estimated body mass index is 26.87 kg/m² as calculated from the following:    Height as of this encounter: 188 cm (74.01\").    Weight as of this encounter: 94.9 kg (209 lb 4.8 oz).          Physical Exam  Constitutional:       Appearance: Normal appearance.   HENT:      Head: Atraumatic.      Right Ear: Tympanic membrane and ear canal normal.      Left Ear: Tympanic membrane and ear canal normal.      Mouth/Throat:      Pharynx: Oropharynx is clear. No oropharyngeal exudate or posterior oropharyngeal erythema.   Eyes:      Conjunctiva/sclera: Conjunctivae normal.   Neck:      Thyroid: No thyroid mass, thyromegaly or thyroid tenderness.   Cardiovascular:      Rate and Rhythm: Normal rate and regular rhythm.      Pulses: Normal pulses.      Heart sounds: Normal heart sounds.   Pulmonary:      Effort: Pulmonary effort is normal.      Breath sounds: Normal breath sounds.   Abdominal:      General: Abdomen is flat. There is no distension.      Palpations: Abdomen is soft. There " is no mass.      Tenderness: There is no abdominal tenderness.      Hernia: No hernia is present.   Genitourinary:     Comments: Patient declined prostate examination  Musculoskeletal:         General: Normal range of motion.      Cervical back: Normal range of motion and neck supple. No muscular tenderness.   Lymphadenopathy:      Cervical: No cervical adenopathy.   Skin:     General: Skin is warm and dry.      Findings: No rash.   Neurological:      General: No focal deficit present.      Mental Status: He is alert and oriented to person, place, and time.   Psychiatric:         Mood and Affect: Mood normal.        Result Review :  The following data was reviewed by: Vasile Stock MD on 08/28/2024:  Common labs          7/26/2024    09:15   Common Labs   Glucose 107    BUN 19    Creatinine 1.06    Sodium 139    Potassium 4.3    Chloride 104    Calcium 9.1    Total Protein 6.2    Albumin 4.5    Total Bilirubin 0.6    Alkaline Phosphatase 57    AST (SGOT) 17    ALT (SGPT) 20    WBC 4.04    Hemoglobin 15.7    Hematocrit 44.8    Platelets 164    Total Cholesterol 158    Triglycerides 39    HDL Cholesterol 59    LDL Cholesterol  90    Hemoglobin A1C 5.30    PSA 0.410                Assessment and Plan   Diagnoses and all orders for this visit:    1. Health care maintenance (Primary)  -     CBC & Differential; Future  -     Comprehensive Metabolic Panel; Future  -     Lipid Panel; Future    2. Chronic low back pain, unspecified back pain laterality, unspecified whether sciatica present      Annual healthcare maintenance visit.    Immunizations.  Discussed.    Colon cancer screening up to date.  Next colonoscopy due 2025.    Prostate cancer screening, up-to-date.  Patient declined prostate examination today.  PSA test normal.    Chronic low back pain.  He continues tramadol 1 pill day.  And he takes Aleve over-the-counter 1 tablet daily.  Sometimes twice a day if he is playing golf.  No GI upset.  Creatinine normal.   We discussed the pros and cons of chronic NSAID use.  At this point the benefits likely outweigh the risks.    Other preventative health practices discussed.  I will see him back in 6 months.  Sooner as needed.           Follow Up   No follow-ups on file.  Patient was given instructions and counseling regarding his condition or for health maintenance advice. Please see specific information pulled into the AVS if appropriate.

## 2024-10-08 DIAGNOSIS — M54.50 CHRONIC LOW BACK PAIN, UNSPECIFIED BACK PAIN LATERALITY, UNSPECIFIED WHETHER SCIATICA PRESENT: ICD-10-CM

## 2024-10-08 DIAGNOSIS — G89.29 CHRONIC LOW BACK PAIN, UNSPECIFIED BACK PAIN LATERALITY, UNSPECIFIED WHETHER SCIATICA PRESENT: ICD-10-CM

## 2024-10-08 NOTE — TELEPHONE ENCOUNTER
Rx Refill Note  Requested Prescriptions     Pending Prescriptions Disp Refills    traMADol (ULTRAM) 50 MG tablet 90 tablet 1     Sig: Take 1 tablet by mouth Daily As Needed for Moderate Pain or Severe Pain.      Last office visit with prescribing clinician: 8/28/2024   Last telemedicine visit with prescribing clinician: Visit date not found   Next office visit with prescribing clinician: 3/5/2025                         Would you like a call back once the refill request has been completed: [] Yes [] No    If the office needs to give you a call back, can they leave a voicemail: [] Yes [] No    Leyla Cruz  10/08/24, 15:33 EDT

## 2024-10-09 RX ORDER — TRAMADOL HYDROCHLORIDE 50 MG/1
50 TABLET ORAL DAILY PRN
Qty: 90 TABLET | Refills: 1 | Status: SHIPPED | OUTPATIENT
Start: 2024-10-09

## 2025-01-29 RX ORDER — SILDENAFIL 100 MG/1
100 TABLET, FILM COATED ORAL DAILY PRN
Qty: 6 TABLET | Refills: 6 | Status: SHIPPED | OUTPATIENT
Start: 2025-01-29

## 2025-01-29 NOTE — TELEPHONE ENCOUNTER
Rx Refill Note  Requested Prescriptions     Pending Prescriptions Disp Refills    sildenafil (VIAGRA) 100 MG tablet 6 tablet 6     Sig: Take 1 tablet by mouth Daily As Needed for Erectile Dysfunction.      Last office visit with prescribing clinician: 8/28/2024   Last telemedicine visit with prescribing clinician: Visit date not found   Next office visit with prescribing clinician: 3/5/2025                         Would you like a call back once the refill request has been completed: [] Yes [] No    If the office needs to give you a call back, can they leave a voicemail: [] Yes [] No    Leyla Cruz  01/29/25, 08:08 EST

## 2025-03-12 ENCOUNTER — OFFICE VISIT (OUTPATIENT)
Dept: FAMILY MEDICINE CLINIC | Facility: CLINIC | Age: 60
End: 2025-03-12
Payer: COMMERCIAL

## 2025-03-12 VITALS
HEIGHT: 74 IN | HEART RATE: 76 BPM | OXYGEN SATURATION: 96 % | DIASTOLIC BLOOD PRESSURE: 89 MMHG | SYSTOLIC BLOOD PRESSURE: 132 MMHG | TEMPERATURE: 96.9 F | WEIGHT: 214 LBS | BODY MASS INDEX: 27.46 KG/M2

## 2025-03-12 DIAGNOSIS — M54.50 CHRONIC LOW BACK PAIN, UNSPECIFIED BACK PAIN LATERALITY, UNSPECIFIED WHETHER SCIATICA PRESENT: Primary | ICD-10-CM

## 2025-03-12 DIAGNOSIS — Z12.5 SCREENING PSA (PROSTATE SPECIFIC ANTIGEN): ICD-10-CM

## 2025-03-12 DIAGNOSIS — Z00.00 HEALTH CARE MAINTENANCE: ICD-10-CM

## 2025-03-12 DIAGNOSIS — G89.29 CHRONIC LOW BACK PAIN, UNSPECIFIED BACK PAIN LATERALITY, UNSPECIFIED WHETHER SCIATICA PRESENT: Primary | ICD-10-CM

## 2025-03-12 RX ORDER — TRAMADOL HYDROCHLORIDE 50 MG/1
50 TABLET ORAL DAILY PRN
Qty: 90 TABLET | Refills: 1 | Status: SHIPPED | OUTPATIENT
Start: 2025-03-12

## 2025-03-12 NOTE — PROGRESS NOTES
"Chief Complaint  Chronic low back pain    Subjective        Delta VARUN Cormier presents to [facility]  History of Present Illness    Follow-up chronic low back pain.  He continues taking approximately 1 tramadol tablet daily.  No adverse effect.  I reviewed his pharmaceutical log.  His prescribers agreement is up-to-date.  His last drug screen was about 2 years ago.  Consistent with therapy.  He states he was on vacation in [place] and had a THC gummy recently.  No other drug use.  No red flag behavior.  He continues exercise on regular basis.  He states the exercise helps his back pain.  The back pain is unchanged.  And no concerning symptoms.    Objective   Vital Signs:  /89   Pulse 76   Temp 96.9 °F (36.1 °C) (Temporal)   Ht 188 cm (74.01\")   Wt 97.1 kg (214 lb)   SpO2 96%   BMI 27.47 kg/m²   Estimated body mass index is 27.47 kg/m² as calculated from the following:    Height as of this encounter: 188 cm (74.01\").    Weight as of this encounter: 97.1 kg (214 lb).          Physical Exam  Vitals and nursing note reviewed.   Constitutional:       General: He is not in acute distress.     Appearance: He is well-developed.   Cardiovascular:      Rate and Rhythm: Normal rate and regular rhythm.      Heart sounds: Normal heart sounds.   Pulmonary:      Effort: Pulmonary effort is normal.      Breath sounds: Normal breath sounds.   Skin:     General: Skin is warm and dry.   Psychiatric:         Mood and Affect: Mood normal.        Result Review :  The following data was reviewed by: Vasile Stock MD on 03/12/2025:  Common labs          7/26/2024    09:15   Common Labs   Glucose 107    BUN 19    Creatinine 1.06    Sodium 139    Potassium 4.3    Chloride 104    Calcium 9.1    Albumin 4.5    Total Bilirubin 0.6    Alkaline Phosphatase 57    AST (SGOT) 17    ALT (SGPT) 20    WBC 4.04    Hemoglobin 15.7    Hematocrit 44.8    Platelets 164    Total Cholesterol 158    Triglycerides 39  "   HDL Cholesterol 59    LDL Cholesterol  90    Hemoglobin A1C 5.30    PSA 0.410                  Assessment and Plan   Diagnoses and all orders for this visit:    1. Chronic low back pain, unspecified back pain laterality, unspecified whether sciatica present (Primary)  -     traMADol (ULTRAM) 50 MG tablet; Take 1 tablet by mouth Daily As Needed for Moderate Pain or Severe Pain.  Dispense: 90 tablet; Refill: 1  -     Hemoglobin A1c; Future  -     CBC & Differential; Future  -     Comprehensive Metabolic Panel; Future  -     Lipid Panel; Future    2. Health care maintenance  -     Hemoglobin A1c; Future  -     CBC & Differential; Future  -     Comprehensive Metabolic Panel; Future  -     Lipid Panel; Future    3. Screening PSA (prostate specific antigen)  -     PSA Screen; Future      Chronic low back pain.  He takes tramadol approximately daily.  1 tablet daily without adverse effect.  No red flag behavior.  Continue same.  Continue regular exercise.  I will see him back in 6 months for annual complete physical examination with lab work prior       Follow Up   No follow-ups on file.  Patient was given instructions and counseling regarding his condition or for health maintenance advice. Please see specific information pulled into the AVS if appropriate.

## 2025-07-07 DIAGNOSIS — M54.50 CHRONIC LOW BACK PAIN, UNSPECIFIED BACK PAIN LATERALITY, UNSPECIFIED WHETHER SCIATICA PRESENT: ICD-10-CM

## 2025-07-07 DIAGNOSIS — G89.29 CHRONIC LOW BACK PAIN, UNSPECIFIED BACK PAIN LATERALITY, UNSPECIFIED WHETHER SCIATICA PRESENT: ICD-10-CM

## 2025-07-07 NOTE — TELEPHONE ENCOUNTER
Caller: Delta Cormier    Relationship: Self    Best call back number: [phone]    Requested Prescriptions:   Requested Prescriptions     Pending Prescriptions Disp Refills    traMADol (ULTRAM) 50 MG tablet 90 tablet 1     Sig: Take 1 tablet by mouth Daily As Needed for Moderate Pain or Severe Pain.        Pharmacy where request should be sent: [facility] [id number] [facility] [id number] [facility] AT [phone] [facility] [phone] FX     Last office visit with prescribing clinician: 3/12/2025   Last telemedicine visit with prescribing clinician: Visit date not found   Next office visit with prescribing clinician: Visit date not found     Additional details provided by patient: PATIENT IS REQUESTING TO HAVE AN EARLY FILL AS HE WILL BE TRAVELLING AND WILL NEED TO  NO LATER THAN THE 9 TH OF THIS WEEK.    IF NOT IT WILL NEED TO BE SENT TO THEIR [facility] IN [place]    Does the patient have less than a 3 day supply:  [] Yes  [x] No    Would you like a call back once the refill request has been completed: [] Yes [x] No    If the office needs to give you a call back, can they leave a voicemail: [] Yes [x] No    Jorge Arevalo Rep   07/07/25 10:58 EDT

## 2025-07-10 NOTE — TELEPHONE ENCOUNTER
Caller: Delta Cormier A    Relationship to patient: Self    Best call back number: [phone]    Patient is needing: PATIENT WOULD LIKE FOR THIS TO BE SENT TO A DIFFERENT PHARMACY NOW. PLEASE SEND TO     [id number] - [place], [place] - [address]  - [phone] [facility] [phone]

## 2025-07-13 RX ORDER — TRAMADOL HYDROCHLORIDE 50 MG/1
50 TABLET ORAL DAILY PRN
Qty: 90 TABLET | Refills: 1 | Status: SHIPPED | OUTPATIENT
Start: 2025-07-13